# Patient Record
Sex: MALE | Race: WHITE | NOT HISPANIC OR LATINO | URBAN - METROPOLITAN AREA
[De-identification: names, ages, dates, MRNs, and addresses within clinical notes are randomized per-mention and may not be internally consistent; named-entity substitution may affect disease eponyms.]

---
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
## 2018-04-21 NOTE — ED PEDIATRIC NURSE REASSESSMENT NOTE - NS ED NURSE REASSESS COMMENT FT2
Internal Medicine Pt signed voluntary psychiatric admission paperwork after being evaluated by telepsych. Pt in no apparent distress, one to one remains in place. EKG completed- copy on pt chart. Pt has no complaints at this time. Offered pt warm blanket but pt refused. Environment remains safe with lights turned off to promote relaxing environment for pt. Will continue to monitor.

## 2018-05-02 ENCOUNTER — EMERGENCY (EMERGENCY)
Facility: HOSPITAL | Age: 18
LOS: 0 days | Discharge: HOME | End: 2018-05-02
Attending: EMERGENCY MEDICINE | Admitting: EMERGENCY MEDICINE
Payer: MEDICAID

## 2018-05-02 VITALS
HEART RATE: 77 BPM | SYSTOLIC BLOOD PRESSURE: 117 MMHG | DIASTOLIC BLOOD PRESSURE: 57 MMHG | TEMPERATURE: 99 F | RESPIRATION RATE: 17 BRPM | OXYGEN SATURATION: 100 %

## 2018-05-02 VITALS — WEIGHT: 160.06 LBS | HEIGHT: 70 IN

## 2018-05-02 PROBLEM — F32.9 MAJOR DEPRESSIVE DISORDER, SINGLE EPISODE, UNSPECIFIED: Chronic | Status: ACTIVE | Noted: 2017-06-17

## 2018-05-02 LAB
ABO RH CONFIRMATION: SIGNIFICANT CHANGE UP
ALBUMIN SERPL ELPH-MCNC: 4.8 G/DL — SIGNIFICANT CHANGE UP (ref 3.3–5)
ALP SERPL-CCNC: 84 U/L — SIGNIFICANT CHANGE UP (ref 40–120)
ALT FLD-CCNC: 33 U/L — SIGNIFICANT CHANGE UP (ref 12–78)
ANION GAP SERPL CALC-SCNC: 8 MMOL/L — SIGNIFICANT CHANGE UP (ref 5–17)
APTT BLD: 31.8 SEC — SIGNIFICANT CHANGE UP (ref 27.5–37.4)
AST SERPL-CCNC: 16 U/L — SIGNIFICANT CHANGE UP (ref 15–37)
BASOPHILS # BLD AUTO: 0.03 K/UL — SIGNIFICANT CHANGE UP (ref 0–0.2)
BASOPHILS NFR BLD AUTO: 0.2 % — SIGNIFICANT CHANGE UP (ref 0–2)
BILIRUB SERPL-MCNC: 1.2 MG/DL — SIGNIFICANT CHANGE UP (ref 0.2–1.2)
BLD GP AB SCN SERPL QL: SIGNIFICANT CHANGE UP
BUN SERPL-MCNC: 17 MG/DL — SIGNIFICANT CHANGE UP (ref 7–23)
CALCIUM SERPL-MCNC: 10.1 MG/DL — SIGNIFICANT CHANGE UP (ref 8.5–10.1)
CHLORIDE SERPL-SCNC: 106 MMOL/L — SIGNIFICANT CHANGE UP (ref 96–108)
CO2 SERPL-SCNC: 26 MMOL/L — SIGNIFICANT CHANGE UP (ref 22–31)
CREAT SERPL-MCNC: 0.98 MG/DL — SIGNIFICANT CHANGE UP (ref 0.5–1.3)
EOSINOPHIL # BLD AUTO: 0.04 K/UL — SIGNIFICANT CHANGE UP (ref 0–0.5)
EOSINOPHIL NFR BLD AUTO: 0.2 % — SIGNIFICANT CHANGE UP (ref 0–6)
GLUCOSE SERPL-MCNC: 121 MG/DL — HIGH (ref 70–99)
HCT VFR BLD CALC: 47.7 % — SIGNIFICANT CHANGE UP (ref 39–50)
HGB BLD-MCNC: 17.1 G/DL — HIGH (ref 13–17)
IMM GRANULOCYTES NFR BLD AUTO: 0.4 % — SIGNIFICANT CHANGE UP (ref 0–1.5)
INR BLD: 1.19 RATIO — HIGH (ref 0.88–1.16)
LACTATE SERPL-SCNC: 1.8 MMOL/L — SIGNIFICANT CHANGE UP (ref 0.7–2)
LIDOCAIN IGE QN: 97 U/L — SIGNIFICANT CHANGE UP (ref 73–393)
LYMPHOCYTES # BLD AUTO: 0.88 K/UL — LOW (ref 1–3.3)
LYMPHOCYTES # BLD AUTO: 4.7 % — LOW (ref 13–44)
MCHC RBC-ENTMCNC: 32.1 PG — SIGNIFICANT CHANGE UP (ref 27–34)
MCHC RBC-ENTMCNC: 35.8 GM/DL — SIGNIFICANT CHANGE UP (ref 32–36)
MCV RBC AUTO: 89.7 FL — SIGNIFICANT CHANGE UP (ref 80–100)
MONOCYTES # BLD AUTO: 0.72 K/UL — SIGNIFICANT CHANGE UP (ref 0–0.9)
MONOCYTES NFR BLD AUTO: 3.8 % — SIGNIFICANT CHANGE UP (ref 2–14)
NEUTROPHILS # BLD AUTO: 17.03 K/UL — HIGH (ref 1.8–7.4)
NEUTROPHILS NFR BLD AUTO: 90.7 % — HIGH (ref 43–77)
NRBC # BLD: 0 /100 WBCS — SIGNIFICANT CHANGE UP (ref 0–0)
PLATELET # BLD AUTO: 265 K/UL — SIGNIFICANT CHANGE UP (ref 150–400)
POTASSIUM SERPL-MCNC: 4 MMOL/L — SIGNIFICANT CHANGE UP (ref 3.5–5.3)
POTASSIUM SERPL-SCNC: 4 MMOL/L — SIGNIFICANT CHANGE UP (ref 3.5–5.3)
PROT SERPL-MCNC: 8.4 GM/DL — HIGH (ref 6–8.3)
PROTHROM AB SERPL-ACNC: 12.9 SEC — HIGH (ref 9.8–12.7)
RBC # BLD: 5.32 M/UL — SIGNIFICANT CHANGE UP (ref 4.2–5.8)
RBC # FLD: 12.6 % — SIGNIFICANT CHANGE UP (ref 10.3–14.5)
SODIUM SERPL-SCNC: 140 MMOL/L — SIGNIFICANT CHANGE UP (ref 135–145)
TYPE + AB SCN PNL BLD: SIGNIFICANT CHANGE UP
WBC # BLD: 18.78 K/UL — HIGH (ref 3.8–10.5)
WBC # FLD AUTO: 18.78 K/UL — HIGH (ref 3.8–10.5)

## 2018-05-02 PROCEDURE — 74177 CT ABD & PELVIS W/CONTRAST: CPT | Mod: 26

## 2018-05-02 PROCEDURE — 99284 EMERGENCY DEPT VISIT MOD MDM: CPT

## 2018-05-02 RX ORDER — SODIUM CHLORIDE 9 MG/ML
3 INJECTION INTRAMUSCULAR; INTRAVENOUS; SUBCUTANEOUS ONCE
Qty: 0 | Refills: 0 | Status: COMPLETED | OUTPATIENT
Start: 2018-05-02 | End: 2018-05-02

## 2018-05-02 RX ORDER — LIDOCAINE 4 G/100G
10 CREAM TOPICAL ONCE
Qty: 0 | Refills: 0 | Status: COMPLETED | OUTPATIENT
Start: 2018-05-02 | End: 2018-05-02

## 2018-05-02 RX ORDER — FAMOTIDINE 10 MG/ML
20 INJECTION INTRAVENOUS ONCE
Qty: 0 | Refills: 0 | Status: COMPLETED | OUTPATIENT
Start: 2018-05-02 | End: 2018-05-02

## 2018-05-02 RX ORDER — SODIUM CHLORIDE 9 MG/ML
1000 INJECTION INTRAMUSCULAR; INTRAVENOUS; SUBCUTANEOUS ONCE
Qty: 0 | Refills: 0 | Status: COMPLETED | OUTPATIENT
Start: 2018-05-02 | End: 2018-05-02

## 2018-05-02 RX ORDER — ONDANSETRON 8 MG/1
1 TABLET, FILM COATED ORAL
Qty: 9 | Refills: 0 | OUTPATIENT
Start: 2018-05-02 | End: 2018-05-04

## 2018-05-02 RX ORDER — ONDANSETRON 8 MG/1
4 TABLET, FILM COATED ORAL ONCE
Qty: 0 | Refills: 0 | Status: COMPLETED | OUTPATIENT
Start: 2018-05-02 | End: 2018-05-02

## 2018-05-02 RX ORDER — HYDROMORPHONE HYDROCHLORIDE 2 MG/ML
1 INJECTION INTRAMUSCULAR; INTRAVENOUS; SUBCUTANEOUS ONCE
Qty: 0 | Refills: 0 | Status: DISCONTINUED | OUTPATIENT
Start: 2018-05-02 | End: 2018-05-02

## 2018-05-02 RX ORDER — HALOPERIDOL DECANOATE 100 MG/ML
3 INJECTION INTRAMUSCULAR ONCE
Qty: 0 | Refills: 0 | Status: COMPLETED | OUTPATIENT
Start: 2018-05-02 | End: 2018-05-02

## 2018-05-02 RX ADMIN — ONDANSETRON 4 MILLIGRAM(S): 8 TABLET, FILM COATED ORAL at 16:56

## 2018-05-02 RX ADMIN — HYDROMORPHONE HYDROCHLORIDE 1 MILLIGRAM(S): 2 INJECTION INTRAMUSCULAR; INTRAVENOUS; SUBCUTANEOUS at 17:56

## 2018-05-02 RX ADMIN — LIDOCAINE 10 MILLILITER(S): 4 CREAM TOPICAL at 19:55

## 2018-05-02 RX ADMIN — SODIUM CHLORIDE 1000 MILLILITER(S): 9 INJECTION INTRAMUSCULAR; INTRAVENOUS; SUBCUTANEOUS at 16:57

## 2018-05-02 RX ADMIN — SODIUM CHLORIDE 3 MILLILITER(S): 9 INJECTION INTRAMUSCULAR; INTRAVENOUS; SUBCUTANEOUS at 16:57

## 2018-05-02 RX ADMIN — HALOPERIDOL DECANOATE 3 MILLIGRAM(S): 100 INJECTION INTRAMUSCULAR at 19:50

## 2018-05-02 RX ADMIN — SODIUM CHLORIDE 1000 MILLILITER(S): 9 INJECTION INTRAMUSCULAR; INTRAVENOUS; SUBCUTANEOUS at 18:45

## 2018-05-02 RX ADMIN — FAMOTIDINE 20 MILLIGRAM(S): 10 INJECTION INTRAVENOUS at 19:55

## 2018-05-02 RX ADMIN — Medication 30 MILLILITER(S): at 19:55

## 2018-05-02 RX ADMIN — HYDROMORPHONE HYDROCHLORIDE 1 MILLIGRAM(S): 2 INJECTION INTRAMUSCULAR; INTRAVENOUS; SUBCUTANEOUS at 17:21

## 2018-05-02 RX ADMIN — ONDANSETRON 4 MILLIGRAM(S): 8 TABLET, FILM COATED ORAL at 18:45

## 2018-05-02 NOTE — ED ADULT TRIAGE NOTE - CHIEF COMPLAINT QUOTE
Patient comes to ED for RLQ pain with vomiting and diarrhea since this morning. pt came from PCP for r/o appendix

## 2018-05-02 NOTE — ED STATDOCS - ATTENDING CONTRIBUTION TO CARE
I, Woodrow Mcpherosn MD,  performed the initial face to face bedside interview with this patient regarding history of present illness, review of symptoms and relevant past medical, social and family history.  I completed an independent physical examination.  I was the initial provider who evaluated this patient. I have signed out the follow up of any pending tests (i.e. labs, radiological studies) to the ACP.  I have communicated the patient’s plan of care and disposition with the ACP.  The history, relevant review of systems, past medical and surgical history, medical decision making, and physical examination was documented by the scribe in my presence and I attest to the accuracy of the documentation.  I, Woodrow Mcpherson MD, personally saw the patient with ACP.  I have personally performed a face to face diagnostic evaluation on this patient.  I have reviewed the ACP note and agree with the history, exam, and plan of care, except as noted.

## 2018-05-02 NOTE — ED STATDOCS - OBJECTIVE STATEMENT
16 y/o male with a PMHx of GERD presents to the ED c/o abd pain, N/V/D since this morning. Pt reports b/l lower abdominal pain that woke pt up with N/V/D beginning at 10:00am this morning. Abd pain is severe, worse in RLQ than LLQ. Pt notes pain with breathing deeply. No fever or any other acute complaints at this time. Pt saw PMD today who advised pt to come to ED for CT scan. No h/o abdominal surgeries. NKDA. +Occasional cocaine use.

## 2018-05-02 NOTE — ED STATDOCS - PROGRESS NOTE DETAILS
17 yr. old male PMH: GERD presents to ED with abdominal pain ,+nausea, +vomiting sudden onset this am. at 10.   Reports bilateral lower abdominal pain. Increased pain on deep inspiration. No fever at present. Seen by Dr. Cruz and sent to ED for further evaluation and CT possible appendicitis. Seen and examined by attending in intake. Plan: IV, IVF, Labs, CT pain medication and zofran. Will F/U with results and re evaluate. Omkarnghenok NP

## 2018-05-04 DIAGNOSIS — K52.9 NONINFECTIVE GASTROENTERITIS AND COLITIS, UNSPECIFIED: ICD-10-CM

## 2018-05-04 DIAGNOSIS — R11.2 NAUSEA WITH VOMITING, UNSPECIFIED: ICD-10-CM

## 2018-05-04 DIAGNOSIS — R10.31 RIGHT LOWER QUADRANT PAIN: ICD-10-CM

## 2018-08-07 ENCOUNTER — EMERGENCY (EMERGENCY)
Facility: HOSPITAL | Age: 18
LOS: 0 days | Discharge: HOME | End: 2018-08-07
Attending: EMERGENCY MEDICINE | Admitting: EMERGENCY MEDICINE
Payer: SELF-PAY

## 2018-08-07 VITALS
SYSTOLIC BLOOD PRESSURE: 123 MMHG | DIASTOLIC BLOOD PRESSURE: 77 MMHG | OXYGEN SATURATION: 100 % | TEMPERATURE: 98 F | RESPIRATION RATE: 13 BRPM | HEART RATE: 75 BPM

## 2018-08-07 VITALS — WEIGHT: 166.01 LBS | HEIGHT: 70 IN
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
## 2018-08-07 DIAGNOSIS — S30.810A ABRASION OF LOWER BACK AND PELVIS, INITIAL ENCOUNTER: ICD-10-CM

## 2018-08-07 DIAGNOSIS — S20.419A ABRASION OF UNSPECIFIED BACK WALL OF THORAX, INITIAL ENCOUNTER: ICD-10-CM
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
## 2018-08-07 PROBLEM — K21.9 GASTRO-ESOPHAGEAL REFLUX DISEASE WITHOUT ESOPHAGITIS: Chronic | Status: ACTIVE | Noted: 2017-06-17

## 2018-08-07 PROBLEM — F19.20 OTHER PSYCHOACTIVE SUBSTANCE DEPENDENCE, UNCOMPLICATED: Chronic | Status: ACTIVE | Noted: 2017-06-17

## 2018-08-07 PROBLEM — R45.851 SUICIDAL IDEATIONS: Chronic | Status: ACTIVE | Noted: 2017-06-17

## 2018-08-07 LAB
ALBUMIN SERPL ELPH-MCNC: 4.2 G/DL — SIGNIFICANT CHANGE UP (ref 3.3–5)
ALP SERPL-CCNC: 80 U/L — SIGNIFICANT CHANGE UP (ref 40–120)
ALT FLD-CCNC: 29 U/L — SIGNIFICANT CHANGE UP (ref 12–78)
ANION GAP SERPL CALC-SCNC: 9 MMOL/L — SIGNIFICANT CHANGE UP (ref 5–17)
APTT BLD: 34.1 SEC — SIGNIFICANT CHANGE UP (ref 27.5–37.4)
AST SERPL-CCNC: 18 U/L — SIGNIFICANT CHANGE UP (ref 15–37)
BASOPHILS # BLD AUTO: 0.03 K/UL — SIGNIFICANT CHANGE UP (ref 0–0.2)
BASOPHILS NFR BLD AUTO: 0.4 % — SIGNIFICANT CHANGE UP (ref 0–2)
BILIRUB SERPL-MCNC: 0.4 MG/DL — SIGNIFICANT CHANGE UP (ref 0.2–1.2)
BLD GP AB SCN SERPL QL: SIGNIFICANT CHANGE UP
BUN SERPL-MCNC: 11 MG/DL — SIGNIFICANT CHANGE UP (ref 7–23)
CALCIUM SERPL-MCNC: 8.5 MG/DL — SIGNIFICANT CHANGE UP (ref 8.5–10.1)
CHLORIDE SERPL-SCNC: 110 MMOL/L — HIGH (ref 96–108)
CO2 SERPL-SCNC: 24 MMOL/L — SIGNIFICANT CHANGE UP (ref 22–31)
CREAT SERPL-MCNC: 0.77 MG/DL — SIGNIFICANT CHANGE UP (ref 0.5–1.3)
EOSINOPHIL # BLD AUTO: 0.2 K/UL — SIGNIFICANT CHANGE UP (ref 0–0.5)
EOSINOPHIL NFR BLD AUTO: 2.5 % — SIGNIFICANT CHANGE UP (ref 0–6)
GLUCOSE SERPL-MCNC: 116 MG/DL — HIGH (ref 70–99)
HCT VFR BLD CALC: 41 % — SIGNIFICANT CHANGE UP (ref 39–50)
HGB BLD-MCNC: 14.4 G/DL — SIGNIFICANT CHANGE UP (ref 13–17)
IMM GRANULOCYTES NFR BLD AUTO: 0.5 % — SIGNIFICANT CHANGE UP (ref 0–1.5)
INR BLD: 1.15 RATIO — SIGNIFICANT CHANGE UP (ref 0.88–1.16)
LYMPHOCYTES # BLD AUTO: 2.34 K/UL — SIGNIFICANT CHANGE UP (ref 1–3.3)
LYMPHOCYTES # BLD AUTO: 29.2 % — SIGNIFICANT CHANGE UP (ref 13–44)
MCHC RBC-ENTMCNC: 32.1 PG — SIGNIFICANT CHANGE UP (ref 27–34)
MCHC RBC-ENTMCNC: 35.1 GM/DL — SIGNIFICANT CHANGE UP (ref 32–36)
MCV RBC AUTO: 91.3 FL — SIGNIFICANT CHANGE UP (ref 80–100)
MONOCYTES # BLD AUTO: 0.67 K/UL — SIGNIFICANT CHANGE UP (ref 0–0.9)
MONOCYTES NFR BLD AUTO: 8.4 % — SIGNIFICANT CHANGE UP (ref 2–14)
NEUTROPHILS # BLD AUTO: 4.74 K/UL — SIGNIFICANT CHANGE UP (ref 1.8–7.4)
NEUTROPHILS NFR BLD AUTO: 59 % — SIGNIFICANT CHANGE UP (ref 43–77)
NRBC # BLD: 0 /100 WBCS — SIGNIFICANT CHANGE UP (ref 0–0)
PLATELET # BLD AUTO: 258 K/UL — SIGNIFICANT CHANGE UP (ref 150–400)
POTASSIUM SERPL-MCNC: 4 MMOL/L — SIGNIFICANT CHANGE UP (ref 3.5–5.3)
POTASSIUM SERPL-SCNC: 4 MMOL/L — SIGNIFICANT CHANGE UP (ref 3.5–5.3)
PROT SERPL-MCNC: 7.5 GM/DL — SIGNIFICANT CHANGE UP (ref 6–8.3)
PROTHROM AB SERPL-ACNC: 12.5 SEC — SIGNIFICANT CHANGE UP (ref 9.8–12.7)
RBC # BLD: 4.49 M/UL — SIGNIFICANT CHANGE UP (ref 4.2–5.8)
RBC # FLD: 12.5 % — SIGNIFICANT CHANGE UP (ref 10.3–14.5)
SODIUM SERPL-SCNC: 143 MMOL/L — SIGNIFICANT CHANGE UP (ref 135–145)
TROPONIN I SERPL-MCNC: <0.015 NG/ML — SIGNIFICANT CHANGE UP (ref 0.01–0.04)
TYPE + AB SCN PNL BLD: SIGNIFICANT CHANGE UP
WBC # BLD: 8.02 K/UL — SIGNIFICANT CHANGE UP (ref 3.8–10.5)
WBC # FLD AUTO: 8.02 K/UL — SIGNIFICANT CHANGE UP (ref 3.8–10.5)

## 2018-08-07 PROCEDURE — 74177 CT ABD & PELVIS W/CONTRAST: CPT | Mod: 26

## 2018-08-07 PROCEDURE — 93010 ELECTROCARDIOGRAM REPORT: CPT

## 2018-08-07 PROCEDURE — 71260 CT THORAX DX C+: CPT | Mod: 26

## 2018-08-07 PROCEDURE — 72125 CT NECK SPINE W/O DYE: CPT | Mod: 26

## 2018-08-07 PROCEDURE — 99285 EMERGENCY DEPT VISIT HI MDM: CPT

## 2018-08-07 PROCEDURE — 70450 CT HEAD/BRAIN W/O DYE: CPT | Mod: 26

## 2018-08-07 RX ORDER — SODIUM CHLORIDE 9 MG/ML
1000 INJECTION INTRAMUSCULAR; INTRAVENOUS; SUBCUTANEOUS ONCE
Qty: 0 | Refills: 0 | Status: COMPLETED | OUTPATIENT
Start: 2018-08-07 | End: 2018-08-07

## 2018-08-07 RX ORDER — FENTANYL CITRATE 50 UG/ML
25 INJECTION INTRAVENOUS ONCE
Qty: 0 | Refills: 0 | Status: DISCONTINUED | OUTPATIENT
Start: 2018-08-07 | End: 2018-08-07

## 2018-08-07 RX ORDER — IBUPROFEN 200 MG
1 TABLET ORAL
Qty: 30 | Refills: 0 | OUTPATIENT
Start: 2018-08-07

## 2018-08-07 RX ORDER — CYCLOBENZAPRINE HYDROCHLORIDE 10 MG/1
1 TABLET, FILM COATED ORAL
Qty: 15 | Refills: 0 | OUTPATIENT
Start: 2018-08-07

## 2018-08-07 RX ORDER — HYDROMORPHONE HYDROCHLORIDE 2 MG/ML
1 INJECTION INTRAMUSCULAR; INTRAVENOUS; SUBCUTANEOUS ONCE
Qty: 0 | Refills: 0 | Status: DISCONTINUED | OUTPATIENT
Start: 2018-08-07 | End: 2018-08-07

## 2018-08-07 RX ORDER — ONDANSETRON 8 MG/1
4 TABLET, FILM COATED ORAL ONCE
Qty: 0 | Refills: 0 | Status: COMPLETED | OUTPATIENT
Start: 2018-08-07 | End: 2018-08-07

## 2018-08-07 RX ORDER — MORPHINE SULFATE 50 MG/1
4 CAPSULE, EXTENDED RELEASE ORAL ONCE
Qty: 0 | Refills: 0 | Status: DISCONTINUED | OUTPATIENT
Start: 2018-08-07 | End: 2018-08-07

## 2018-08-07 RX ADMIN — SODIUM CHLORIDE 1000 MILLILITER(S): 9 INJECTION INTRAMUSCULAR; INTRAVENOUS; SUBCUTANEOUS at 14:30

## 2018-08-07 RX ADMIN — FENTANYL CITRATE 25 MICROGRAM(S): 50 INJECTION INTRAVENOUS at 13:36

## 2018-08-07 RX ADMIN — ONDANSETRON 4 MILLIGRAM(S): 8 TABLET, FILM COATED ORAL at 14:18

## 2018-08-07 RX ADMIN — MORPHINE SULFATE 4 MILLIGRAM(S): 50 CAPSULE, EXTENDED RELEASE ORAL at 14:51

## 2018-08-07 RX ADMIN — HYDROMORPHONE HYDROCHLORIDE 1 MILLIGRAM(S): 2 INJECTION INTRAMUSCULAR; INTRAVENOUS; SUBCUTANEOUS at 13:54

## 2018-08-07 RX ADMIN — MORPHINE SULFATE 4 MILLIGRAM(S): 50 CAPSULE, EXTENDED RELEASE ORAL at 17:00

## 2018-08-07 RX ADMIN — HYDROMORPHONE HYDROCHLORIDE 1 MILLIGRAM(S): 2 INJECTION INTRAMUSCULAR; INTRAVENOUS; SUBCUTANEOUS at 14:50

## 2018-08-07 RX ADMIN — MORPHINE SULFATE 4 MILLIGRAM(S): 50 CAPSULE, EXTENDED RELEASE ORAL at 16:20

## 2018-08-07 RX ADMIN — SODIUM CHLORIDE 2000 MILLILITER(S): 9 INJECTION INTRAMUSCULAR; INTRAVENOUS; SUBCUTANEOUS at 13:28

## 2018-08-07 RX ADMIN — MORPHINE SULFATE 4 MILLIGRAM(S): 50 CAPSULE, EXTENDED RELEASE ORAL at 15:30

## 2018-08-07 RX ADMIN — FENTANYL CITRATE 25 MICROGRAM(S): 50 INJECTION INTRAVENOUS at 14:50

## 2018-08-07 NOTE — ED ADULT NURSE REASSESSMENT NOTE - NS ED NURSE REASSESS COMMENT FT1
see trauma flowsheet. all tests negative. pts pain is better. being discharged home with girlfriend. gave pt a sling for right shoulder. script being sent to his pharmacy

## 2018-08-07 NOTE — ED ADULT NURSE NOTE - NSIMPLEMENTINTERV_GEN_ALL_ED
Implemented All Fall Risk Interventions:  [place] to call system. Call bell, personal items and telephone within reach. Instruct patient to call for assistance. Room bathroom lighting operational. Non-slip footwear when patient is off stretcher. Physically safe environment: no spills, clutter or unnecessary equipment. Stretcher in lowest position, wheels locked, appropriate side rails in place. Provide visual cue, wrist band, yellow gown, etc. Monitor gait and stability. Monitor for mental status changes and reorient to person, place, and time. Review medications for side effects contributing to fall risk. Reinforce activity limits and safety measures with patient and family.

## 2018-08-07 NOTE — ED PROVIDER NOTE - PROGRESS NOTE DETAILS
AJM: pt receive din signout. feeling improved. workup neg. seen by dr hendricks and cleared by trauma. All results discussed with the patient, and a copy of results has been provided. Patient is comfortable with dc plan for home. Opportunity for questions was provided and all questions have been adressed.
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
## 2018-08-07 NOTE — ED PROVIDER NOTE - NS ED ATTENDING STATEMENT MOD
Patient may shower after 24 hours. - Stressed appropriate blood glucose control  -  Continue antibiotics per infectious disease  -  Follow-up in 2 weeks For reevaluation  -The patient has any questions, medications he is to call the office.       Kori Dupree DPM  Cell:[phone]
Attending Only
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
## 2018-08-07 NOTE — ED PROVIDER NOTE - MUSCULOSKELETAL, MLM
+posterior c-spine ttp.  Left lateral chest wall ecchymosis abrasion TTP. No midline C/T/LS ttp.  No facial ttp.  No hip instability or ttp/FROM/nv intact, moves all 4 extremities with no pain.

## 2019-07-09 ENCOUNTER — TRANSCRIPTION ENCOUNTER (OUTPATIENT)
Age: 19
End: 2019-07-09

## 2019-12-04 ENCOUNTER — TRANSCRIPTION ENCOUNTER (OUTPATIENT)
Age: 19
End: 2019-12-04

## 2020-06-18 NOTE — BEHAVIORAL HEALTH ASSESSMENT NOTE - DESCRIPTION (FIRST USE, LAST USE, QUANTITY, FREQUENCY, DURATION)
HR=78 bpm, GEFG=991/86 mmhg, SpO2=99.0 %, Resp=16 B/min, EtCO2=31 mmHg, Apnea=1 Seconds, Bushra=10 reports drinking 2-3 "tall boys" per day with intermittent "shakes" when not drinking 2x/week once every other week in the past, took tramadol yesterday xanax in the past LSD once every other week

## 2020-07-30 ENCOUNTER — EMERGENCY (EMERGENCY)
Facility: HOSPITAL | Age: 20
LOS: 0 days | Discharge: HOME | End: 2020-07-30
Payer: MEDICARE

## 2020-07-30 VITALS
TEMPERATURE: 98 F | SYSTOLIC BLOOD PRESSURE: 113 MMHG | DIASTOLIC BLOOD PRESSURE: 74 MMHG | HEART RATE: 66 BPM | OXYGEN SATURATION: 99 % | RESPIRATION RATE: 18 BRPM

## 2020-07-30 DIAGNOSIS — Z11.59 ENCOUNTER FOR SCREENING FOR OTHER VIRAL DISEASES: ICD-10-CM

## 2020-07-30 DIAGNOSIS — Z03.818 ENCOUNTER FOR OBSERVATION FOR SUSPECTED EXPOSURE TO OTHER BIOLOGICAL AGENTS RULED OUT: ICD-10-CM

## 2020-07-30 PROCEDURE — 99283 EMERGENCY DEPT VISIT LOW MDM: CPT

## 2020-07-30 PROCEDURE — U0003: CPT

## 2020-07-30 NOTE — ED ADULT TRIAGE NOTE - CHIEF COMPLAINT QUOTE
pt presents to ED for covid testing, pt deneis symptoms, pt seen and evaluated by PA pt gave verbal  consent to recevie results via text

## 2020-07-30 NOTE — ED STATDOCS - OBJECTIVE STATEMENT
Patient presents with no symptoms at this time. No recent exposure to COVID-19. Patient here for testing.

## 2020-07-30 NOTE — ED STATDOCS - CLINICAL SUMMARY MEDICAL DECISION MAKING FREE TEXT BOX
Patient asymptomatic with recent exposure. Patient well appearing, will test for COVID-19 and discharge.

## 2020-07-30 NOTE — ED STATDOCS - PATIENT PORTAL LINK FT
You can access the FollowMyHealth Patient Portal offered by [facility] by registering at the following website: [email or web address]. By joining [facility]’s FollowMyHealth portal, you will also be able to view your health information using other applications (apps) compatible with our system.

## 2020-07-30 NOTE — ED STATDOCS - NS ED ROS FT
GEN: Denies fever, chills, sick contacts, recent travel  HEENT: Denies dizziness, blurry vision, HA  Cardiac: Denies CP, SOB, palpitations  Lungs: Denies cough, wheezing  PV: Denies LE swelling  GI: Denies nausea, vomiting, diarrhea, constipation, flank pain  : Denies hematuria, dysuria, frequency, urgency  Skin: Denies rash, redness, open wound  MSK: Denies pain, decreased ROM  Neuro: Denies dizziness, difficulty speaking, difficulty swallowing, numbness/tingling in extremities, loss of bowel/bladder control, weakness in extremities

## 2020-07-31 LAB — SARS-COV-2 RNA SPEC QL NAA+PROBE: SIGNIFICANT CHANGE UP

## 2020-08-04 NOTE — BEHAVIORAL HEALTH ASSESSMENT NOTE - NS ED BHA MED ROS ALLERGIC IMMUNOLOGIC
[Mother] : mother [Yes] : Patient goes to dentist yearly [Toothpaste] : Primary Fluoride Source: Toothpaste [Up to date] : Up to date [Sleep Concerns] : no sleep concerns [Eats meals with family] : eats meals with family [Grade: ____] : Grade: [unfilled] [Normal Performance] : normal performance [Eats regular meals including adequate fruits and vegetables] : eats regular meals including adequate fruits and vegetables [At least 1 hour of physical activity a day] : at least 1 hour of physical activity a day [Has concerns about body or appearance] : does not have concerns about body or appearance [Uses electronic nicotine delivery system] : does not use electronic nicotine delivery system [Has interests/participates in community activities/volunteers] : has interests/participates in community activities/volunteers. [Uses drugs] : does not use drugs  [Uses tobacco] : does not use tobacco [Drinks alcohol] : does not drink alcohol [No] : Patient has not had sexual intercourse [FreeTextEntry1] : \par -h/o Chiari malformation. s/p surgery x 2\par -h/o card eval in past- last '18. + h/o SVT\par   Asymptomatic. No restrictions\par -s/p endo consult re; BS concern. Has f/u appt in Sept [de-identified] : in a club. Likes to draw No complaints

## 2021-02-17 NOTE — BEHAVIORAL HEALTH ASSESSMENT NOTE - NSBHCHARTREVIEWLAB_PSY_A_CORE FT
Additional Notes: Patient consent was obtained to proceed with the visit and recommended plan of care after discussion of all risks and benefits, including the risks of COVID-19 exposure. Detail Level: Simple Additional Notes: Patient had begun using Tremfya in past however states she was not able to continue treatment due to problems regarding insurance and previous dermatologist. 06-16    140  |  105  |  12  ----------------------------<  117<H>  3.6   |  29  |  0.77    Ca    8.9      16 Jun 2017 21:35    TPro  7.3  /  Alb  4.1  /  TBili  0.4  /  DBili  x   /  AST  14<L>  /  ALT  26  /  AlkPhos  86  06-16                        15.0   11.3  )-----------( 282      ( 16 Jun 2017 21:35 )             42.2

## 2021-07-03 ENCOUNTER — EMERGENCY (EMERGENCY)
Facility: HOSPITAL | Age: 21
LOS: 0 days | Discharge: HOME | End: 2021-07-03
Attending: EMERGENCY MEDICINE
Payer: SELF-PAY

## 2021-07-03 VITALS — HEIGHT: 70 IN | HEART RATE: 74 BPM | WEIGHT: 149.91 LBS | OXYGEN SATURATION: 97 %

## 2021-07-03 VITALS
DIASTOLIC BLOOD PRESSURE: 81 MMHG | HEART RATE: 80 BPM | OXYGEN SATURATION: 98 % | RESPIRATION RATE: 18 BRPM | SYSTOLIC BLOOD PRESSURE: 120 MMHG | TEMPERATURE: 98 F

## 2021-07-03 DIAGNOSIS — J02.9 ACUTE PHARYNGITIS, UNSPECIFIED: ICD-10-CM

## 2021-07-03 DIAGNOSIS — Z91.5 PERSONAL HISTORY OF SELF-HARM: ICD-10-CM

## 2021-07-03 DIAGNOSIS — R07.0 PAIN IN THROAT: ICD-10-CM

## 2021-07-03 DIAGNOSIS — K21.9 GASTRO-ESOPHAGEAL REFLUX DISEASE WITHOUT ESOPHAGITIS: ICD-10-CM

## 2021-07-03 DIAGNOSIS — F19.20 OTHER PSYCHOACTIVE SUBSTANCE DEPENDENCE, UNCOMPLICATED: ICD-10-CM

## 2021-07-03 DIAGNOSIS — F32.9 MAJOR DEPRESSIVE DISORDER, SINGLE EPISODE, UNSPECIFIED: ICD-10-CM

## 2021-07-03 DIAGNOSIS — F17.200 NICOTINE DEPENDENCE, UNSPECIFIED, UNCOMPLICATED: ICD-10-CM

## 2021-07-03 PROCEDURE — 96374 THER/PROPH/DIAG INJ IV PUSH: CPT

## 2021-07-03 PROCEDURE — 99284 EMERGENCY DEPT VISIT MOD MDM: CPT

## 2021-07-03 PROCEDURE — 96375 TX/PRO/DX INJ NEW DRUG ADDON: CPT

## 2021-07-03 PROCEDURE — 99284 EMERGENCY DEPT VISIT MOD MDM: CPT | Mod: 25

## 2021-07-03 RX ORDER — DIPHENHYDRAMINE HCL 50 MG
50 CAPSULE ORAL ONCE
Refills: 0 | Status: COMPLETED | OUTPATIENT
Start: 2021-07-03 | End: 2021-07-03

## 2021-07-03 RX ORDER — DEXAMETHASONE 0.5 MG/5ML
10 ELIXIR ORAL ONCE
Refills: 0 | Status: COMPLETED | OUTPATIENT
Start: 2021-07-03 | End: 2021-07-03

## 2021-07-03 RX ORDER — KETOROLAC TROMETHAMINE 30 MG/ML
30 SYRINGE (ML) INJECTION ONCE
Refills: 0 | Status: DISCONTINUED | OUTPATIENT
Start: 2021-07-03 | End: 2021-07-03

## 2021-07-03 RX ORDER — LIDOCAINE 4 G/100G
10 CREAM TOPICAL ONCE
Refills: 0 | Status: COMPLETED | OUTPATIENT
Start: 2021-07-03 | End: 2021-07-03

## 2021-07-03 RX ADMIN — LIDOCAINE 10 MILLILITER(S): 4 CREAM TOPICAL at 15:23

## 2021-07-03 RX ADMIN — Medication 10 MILLIGRAM(S): at 13:56

## 2021-07-03 RX ADMIN — Medication 50 MILLIGRAM(S): at 13:56

## 2021-07-03 RX ADMIN — Medication 30 MILLIGRAM(S): at 14:15

## 2021-07-03 RX ADMIN — Medication 30 MILLIGRAM(S): at 13:55
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
## 2021-07-03 NOTE — ED PROVIDER NOTE - PROGRESS NOTE DETAILS
patient feeling better. drank full cup of water without issue. pt to follow up with PMD and GI. vss. no signs of anaphylaxis/allergic rxn. Hans Reyes M.D., Attending Physician

## 2021-07-03 NOTE — ED PROVIDER NOTE - PENDING LAB RAD OPT OUT
[facility]  HEMATOLOGY & ONCOLOGY    Cancer Staging Information:  Cancer Staging  Malignant neoplasm of upper-outer quadrant of left female breast (CMS/HCC)  Staging form: Breast, AJCC V7  - Clinical stage from 10/11/2016: Stage IV (T2, N1, M1) - Signed by Calli Monsalve APRN on 10/11/2016        Subjective     VISIT DIAGNOSIS:   Encounter Diagnosis   Name Primary?   • Carcinoma of left breast metastatic to bone (CMS/HCC)        REASON FOR VISIT:     Chief Complaint   Patient presents with   • Breast Cancer     her efor chemo, complaintes of having trouble swallowing and things getting hung in throat         HEMATOLOGY / ONCOLOGY HISTORY:   Oncology/Hematology History    Patient is a 67-year-old postmenopausal female who had onset of her menses at age 9 and menopause at age 48. She received oral contraceptives for approximately 14 years and did not receive hormonal manipulation. Patient has a maternal cousin had breast cancer. Patient has had no prior breast biopsies. Patient found a palpable left breast mass as well as a left axillary mass. Patient had a bloody nipple discharge which been present for approximately 6 months. On 6/20/2012, a mammogram was performed showing a subareolar mass consistent with malignancy. Bilateral breast ultrasound revealed an ill-defined subareolar mass measuring 2.8-2.5 cm. There is a left axillary mass measuring 1.7 cm with suspicion of extracapsular extension. There is no evidence of disease in the right breast. On 7/9/2012 patient underwent a left breast biopsy and placement of marker clip. Left breast biopsy revealed an infiltrating lobular carcinoma grade 2 with focal ductal carcinoma in situ, solid pattern. A fine-needle biopsy of the left lymph node was consistent with metastatic carcinoma. Breast tumor profile revealed ER: 100%; LA: 98%; HER-2/maynor 2+; FISH: Unamplified; Ki-67: 71%; P 53 1%; DNA aneuploidy. A MRI of the breast were performed body  multiple abnormal enhancing masses within the left breast. There appears to be anterior retraction of the pectoralis muscle with no direct extension. There is a moderate to large, layering left pleural effusion appreciated. The right breast reveals 3 moderately enlarged lymph nodes in the posterior lateral aspect the right breast. These have fatty hilum but followup is recommended. Patient is undergone systemic studies including, on 8/2/2012, a CT scan of the brain showing atrophy. a CT scan that shows showing a small to moderate left pleural effusion with 3 subcentimeter nodule densities in the left lung.   She completed 4 cycles of neoadjuvant treatment with dose dense Adriamycin and Cytoxan. She had a mammogram which showed a significant reduction in the size of her left breast lesion. There is no axillary adenopathy noted. She has had an ultrasound revealing the  lesion reducing from 2.4 cm to 1 cm. Patient underwent a left mastectomy on 03/19/2013 finding negative invasive cancer, negative nodes. A 5% DCIS was noted. The patient has declined hormonal therapy. She did not need radiation therapy.  November 2014, she began to have evidence of lytic bone lesions at T5T6, frontal disease, an 8 mm lucency in the central frontal area of  the skull but nothing intracranial. Bone scan revealed only vertex tracer activity but bilateral ribs and thoracic spine, and other lesions in  her pelvis. At that time, she was started on letrozole since November 2014. She is taking Xgeva. She had progression found in bone in Feb 2015 on scan. She was started on Faslodex, Ibrance and continued xgeva.         Malignant neoplasm of upper-outer quadrant of left female breast (CMS/HCC)    7/9/2012 Initial Diagnosis     Malignant neoplasm of upper-outer quadrant of left female breast         7/10/2012 Biopsy     Left Breast Biopsy & Axillary Node FNA: A) Infiltrating lobular carcinoma, Grade 2. B) Foci of ductal carcinoma in situ, solid  pattern. C) Greatest dimension of the invasive carcinoma is 15.8mm.         3/19/2013 Surgery     Left Mastectomy w/ Axillary Tail: Focal residual ducatal carcinoma in situ (DCIS) 12 lymph nodes negative for metastatic carcinoma.         10/20/2014 Biopsy     Peritoneum Biopsy: Final Diagnosis: Malignant Cell Neoplasm.           Breast cancer metastasized to bone, unspecified laterality (CMS/HCC)    10/15/2012 -  Other Event     Left mammogram:  Impression:  1. Decreasing spiculation and density in the retroareolar left breast, near a previous biopsy.   2. Definite left axillary lesion is not appreciated.          2/5/2013 -  Other Event     Diagnostic Mammo:  Comparison is made to 10/15/2012 and 6/20/2012  The spiculated mass at 12:00 behind the nipple is nearly resolved.   Impression:  1. The mass on the left side is less apparent after receiving chemotherapy. There has been a good response to chmotherapy on the left side.   2. No suspicious abnormality is seen in the right breast to account for the new palable abnormality at the 4-5:00 position.          3/19/2013 Surgery     Breast Biopsy:  Final Diagnosis:  A. Breast, left mastectomy with axillary tail  1. Focal residual ductal carcinoma in situ (DICS) (less than 5% of tumor bed).  2. Negative for residual invasive carcinoma.   3. 12 Lymph Nodes, negative for metastatic carcinoma (0/12) focally, some lymph nodes demonstrate fibrosis/treatment effect.  B. Skin and soft tissue, left advancement flap:  1. Benign skin and subcutis.   2. Negative for carcinoma.          6/20/2013 -  Other Event     Diagnostic Mammo [facility]:  1. A subareolar mass is consistent with malignancy. Additional involement extends inferiorly sonographically and superolaterally mammograhically as demonstrated by calcifications. The mass measures approx 2.8 cm sonographically, but the involved region is likely much larger including an intraductal componet. The left axillary lymph nodes are  also involved.   2. Recommened ultrasound guided biopsy of the left subareolar mass and left axillary lymph node.   3. Breast MRI could also further define multicentric disease on the left.   4. Clear suspicious finding on the right.  Recommend ongoing clinical follow up of palpable foci.          6/13/2014 -  Other Event     Diagnostic Mammo:  IMPRESSION:  1. History of left breast cancer and mastectomy. Stable benign right breast mammograms.          10/20/2014 Surgery     Final Diagnosis:  Biopsies, pertoneum:  Metastatic carcinoma, morphologically compatible with metastatic mammary carcinoma.          6/15/2015 -  Other Event     Diagnostic Mammo:  IMPRESSION:   1. Negative x-ray report, should not delay biopsy if a dominat or clinically suspicious mass is present.          7/5/2016 -  Other Event     Diagnositic mammogram:  Impression:   Stable right mamogram with no radiographic findings suspicious for malignancy. Recommend continued screening mammography per screening guidelines unless otherwise earlier clinically indicated.          9/18/2016 Initial Diagnosis     Secondary malignant neoplasm of bone and bone marrow                INTERVAL HISTORY  Patient ID: Heavenly Yanes is a 74 y.o. year old female Cancer Staging  Stage IV (T2, N1, M1)  --complaining of dyspnea on exersion since last Tuesday. She feels like she ran a mile  2/14/19: cxr showed pneumonia. Already on levofloxacin. Coughing not worse, she did not run temp.  Overall feels weak.  -restarted the appetite suppressant and is starting to eat more but not a whole lot  --2/28/19: CT c/ap with some response to therapy. New left chest inflammatory lesion to be followed ion next imaging. Bone scan unremarkable for progression.  4/25/19: she is much better since being off therapy for 2 weeks. COugh is improved.   6/20/19: has left ear drainage which is her usual ear infection. Leg edema improved with lasix. She was ordered a mammo by the surgeon which  I told her is not necessary, she has metastatic dz.    7/25/19: complaining of increased dyspnea on exertion. She takes her laxis daily.    8/22/19:  complaining of increased dyspnea on exertion. She takes her laxis daily. Also not much appetite even though on megace 40bid. YAIR not worse from prior. Weight the same  8/30/19: she presents today to review CT scan. Overall stable in terms of her cancer but there is a concerning are of extraluminal collection of air in the deep pelvis with a thickened wall measuring about 2.8 x 3.2 cm that is situated between the lower sigmoid colon and small bowel and just above the uterus. This likely represents a contained sigmoid colon perforation related to prior diverticulitis. Perforated neoplasm cannot be ruled out. GI consultation is recommended for this finding. The sigmoid colon and adjacent small bowel loops appear to be matted around this collection.  -11/7/19: main concern from  is lack of appetite. contnues to be fatigued.  11/15/19: appetite still same per  but he nags at her to eat more. Pt complaining of choking today and not able to swallow. Per , it has been happening for a while. Will refer to GI  -currently not having any abdominal symptoms.  -Denies cp/n/v/, diarrhea/fever/chills/neuropathy/ no focal weakness.   Rest of ros unremarkable. 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) 9/18/2016     Past Surgical History:   Past Surgical History:   Procedure Laterality Date   • BREAST SURGERY      left breast biopsy and axillary node   • CHOLECYSTECTOMY     • COLONOSCOPY  08/15/2014    diverticulosis  otherwise normal  colonoscopy   • EXTERNAL EAR SURGERY     • MASTECTOMY Left    • PORTACATH PLACEMENT       Social History:   Social History     Socioeconomic History   • Marital status:      Spouse name: Not on file   • Number of children: Not on file   • Years of education: Not on file   • Highest education level: Not on file   Tobacco Use   • Smoking status: Never Smoker   • Smokeless tobacco: Never Used   Substance and Sexual Activity   • Alcohol use: No   • Drug use: No   • Sexual activity: Defer     Family History:   Family History   Problem Relation Age of Onset   • No Known Problems Daughter    • No Known Problems Son    • Other Mother         complications from a concussion from a fall   • Other Father         old age   • No Known Problems Brother    • No Known Problems Daughter        Review of Systems   Constitutional: Positive for activity change, appetite change and fatigue. Negative for unexpected weight change.   HENT: Negative.    Eyes: Negative.    Respiratory: Positive for shortness of breath.    Cardiovascular: Positive for leg swelling.   Gastrointestinal: Negative.    Endocrine: Negative.    Genitourinary: Negative.    Musculoskeletal: Negative.    Skin: Negative.    Neurological: Negative.    Hematological: Negative.    Psychiatric/Behavioral: Negative.         Performance Status:  Asymptomatic    Medications:    Current Outpatient Medications   Medication Sig Dispense Refill   • Calcium-Magnesium-Vitamin D (CALCIUM 1200+D3 PO) Take 1,200 mg by mouth Daily.     • capecitabine (XELODA) 500 MG chemo tablet Take 1 tablet by mouth daily for 2 weeks on and 1 week off. 21 tablet 5   • ciprofloxacin (CIPRO) 500 MG tablet Take 1 tablet by mouth 2 (Two) Times a Day. 28 tablet 0   • furosemide (LASIX) 20 MG tablet One tab po qd as needed for swelling take with potassium 30 tablet 3   • megestrol (MEGACE) 40 MG tablet Take 1 tablet by mouth 2 (Two) Times a Day. 60 tablet 5   • metoprolol tartrate (LOPRESSOR) 25 MG  "tablet Take 25 mg by mouth 2 (two) times a day.     • ofloxacin (FLOXIN) 0.3 % otic solution      • OLANZapine (ZYPREXA) 2.5 MG tablet Take 2.5 mg by mouth every night.     • ondansetron (ZOFRAN) 8 MG tablet Take 1 tablet by mouth Every 8 (Eight) Hours As Needed for Nausea or Vomiting. 30 tablet 5   • potassium chloride (K-DUR,KLOR-CON) 20 MEQ CR tablet Take one tab po bid on days taking Lasix 60 tablet 1   • raNITIdine (ZANTAC) 150 MG tablet Take 1 tablet by mouth 2 (Two) Times a Day. 60 tablet 5   • rivaroxaban (XARELTO) 20 MG tablet Take 1 tablet by mouth Daily. 30 tablet 5   • thyroid 60 MG PO tablet Take 60 mg by mouth daily.       No current facility-administered medications for this visit.      Facility-Administered Medications Ordered in Other Visits   Medication Dose Route Frequency Provider Last Rate Last Dose   • heparin flush (porcine) 100 UNIT/ML injection 500 Units  500 Units Intravenous PRN Joseph Nunez MD   500 Units at 10/26/17 1155   • sodium chloride 0.9 % flush 10 mL  10 mL Intravenous PRN Joseph Nunez MD   10 mL at 10/26/17 1155       ALLERGIES:    Allergies   Allergen Reactions   • Other Angioedema     POPPY SEED   • Penicillins Hives   • Sulfa Antibiotics Mental Status Change   • Sulfur Mental Status Change   • Valium [Diazepam] Unknown (See Comments)     Unable to remember reaction   • Benadryl [Diphenhydramine Hcl (Sleep)] Other (See Comments)     Shaky legs   • Codeine Rash   • Diphenhydramine Other (See Comments)     \"Shaky leg syndrome\"  \"Shaky leg syndrome\"  \"Shaky leg syndrome\"   • Heparin Other (See Comments)     Patient states tolerates Heparin flush without problems: Years ago had heparin IV and had a rash   • Petroleum Jelly [Skin Protectants, Misc.] Rash       Objective      Vitals:    11/15/19 0911   BP: 111/68   Pulse: 87   Resp: 16   Temp: 98.5 °F (36.9 °C)   SpO2: 90%   Weight: 54.3 kg (119 lb 11.2 oz)   Height: 165.1 cm (65\")   PainSc:   2   PainLoc: Throat  Comment: got " choaked up         Current Status 11/15/2019   ECOG score 0         Physical Examunchanged  General Appearance: Patient is awake, alert, oriented and in no acute distress. Patient is welldeveloped, wellnourished, and appears stated age.  HEENT: Normocephalic. Sclerae clear, conjunctiva pink, extraocular movements intact, pupils, round, reactive to light and  accommodation. Mouth and throat are clear with moist oral mucosa.  NECK: Supple, no jugular venous distention, thyroid not enlarged.  LYMPH: No cervical, supraclavicular, axillary, or inguinal lymphadenopathy.  CHEST: Equal bilateral expansion, AP  diameter normal, resonant percussion note  LUNGS: Good air movement, no rales, rhonchi, rubs or wheezes with auscultation on the right. Diminished on the left.  CARDIO: Regular sinus rhythm, no murmurs, gallops or rubs.  ABDOMEN: Nondistended, soft, No tenderness, no guarding, no rebound, No hepatosplenomegaly. No abdominal masses. Bowel sounds positive. No hernia  GENITALIA: Not examined.  BREASTS: Not examined.  MUSKEL: No joint swelling, decreased motion, or inflammation  EXTREMS: patrick le  Edema L>R stable,No clubbing, cyanosis, No varicose veins.  NEURO: Grossly nonfocal, Gait is coordinated and smooth, Cognition is preserved.  SKIN: No rashes, no ecchymoses, no petechia.  PSYCH: Oriented to time, place and person. Memory is preserved. Mood and affect appear normal  RECENT LABS:  Lab on 11/15/2019   Component Date Value Ref Range Status   • Glucose 11/15/2019 106* 65 - 99 mg/dL Final   • BUN 11/15/2019 10  8 - 23 mg/dL Final   • Creatinine 11/15/2019 0.65  0.57 - 1.00 mg/dL Final   • Sodium 11/15/2019 138  136 - 145 mmol/L Final   • Potassium 11/15/2019 3.5  3.5 - 5.2 mmol/L Final   • Chloride 11/15/2019 100  98 - 107 mmol/L Final   • CO2 11/15/2019 31.0* 22.0 - 29.0 mmol/L Final   • Calcium 11/15/2019 9.4  8.6 - 10.5 mg/dL Final   • Total Protein 11/15/2019 6.5  6.0 - 8.5 g/dL Final   • Albumin 11/15/2019 3.40*  3.50 - 5.20 g/dL Final   • ALT (SGPT) 11/15/2019 10  1 - 33 U/L Final   • AST (SGOT) 11/15/2019 18  1 - 32 U/L Final   • Alkaline Phosphatase 11/15/2019 81  39 - 117 U/L Final   • Total Bilirubin 11/15/2019 0.6  0.2 - 1.2 mg/dL Final   • eGFR Non African Amer 11/15/2019 89  >60 mL/min/1.73 Final   • Globulin 11/15/2019 3.1  gm/dL Final   • A/G Ratio 11/15/2019 1.1  g/dL Final   • BUN/Creatinine Ratio 11/15/2019 15.4  7.0 - 25.0 Final   • Anion Gap 11/15/2019 7.0  5.0 - 15.0 mmol/L Final   • WBC 11/15/2019 4.92  3.40 - 10.80 10*3/mm3 Final   • RBC 11/15/2019 3.32* 3.77 - 5.28 10*6/mm3 Final   • Hemoglobin 11/15/2019 11.2* 12.0 - 15.9 g/dL Final   • Hematocrit 11/15/2019 32.8* 34.0 - 46.6 % Final   • MCV 11/15/2019 98.8* 79.0 - 97.0 fL Final   • MCH 11/15/2019 33.7* 26.6 - 33.0 pg Final   • MCHC 11/15/2019 34.1  31.5 - 35.7 g/dL Final   • RDW 11/15/2019 19.0* 12.3 - 15.4 % Final   • RDW-SD 11/15/2019 66.7* 37.0 - 54.0 fl Final   • MPV 11/15/2019 10.1  6.0 - 12.0 fL Final   • Platelets 11/15/2019 417  140 - 450 10*3/mm3 Final   • Neutrophil % 11/15/2019 63.7  42.7 - 76.0 % Final   • Lymphocyte % 11/15/2019 30.1  19.6 - 45.3 % Final   • Monocyte % 11/15/2019 3.0* 5.0 - 12.0 % Final   • Eosinophil % 11/15/2019 0.8  0.3 - 6.2 % Final   • Basophil % 11/15/2019 1.2  0.0 - 1.5 % Final   • Immature Grans % 11/15/2019 1.2* 0.0 - 0.5 % Final   • Neutrophils, Absolute 11/15/2019 3.13  1.70 - 7.00 10*3/mm3 Final   • Lymphocytes, Absolute 11/15/2019 1.48  0.70 - 3.10 10*3/mm3 Final   • Monocytes, Absolute 11/15/2019 0.15  0.10 - 0.90 10*3/mm3 Final   • Eosinophils, Absolute 11/15/2019 0.04  0.00 - 0.40 10*3/mm3 Final   • Basophils, Absolute 11/15/2019 0.06  0.00 - 0.20 10*3/mm3 Final   • Immature Grans, Absolute 11/15/2019 0.06* 0.00 - 0.05 10*3/mm3 Final   • nRBC 11/15/2019 0.0  0.0 - 0.2 /100 WBC Final       RADIOLOGY:  No results found.         Assessment/Plan  Heavenly Yanes is a 74 y.o. year old female with met breast cancer  Exclude Pending Lab, Cardiology, and Radiology orders from printing on the Patient's Discharge Instructions, due to Privacy Concerns. s/p multiple lines of CMT currently on taxotere weekly and xeloda with good tolerance.    Patient Active Problem List   Diagnosis   • Malignant neoplasm of upper-outer quadrant of left female breast (CMS/HCC)   • Breast cancer metastasized to bone, unspecified laterality (CMS/HCC)   • Essential hypertension   • Acquired hypothyroidism   • Bipolar 1 disorder (CMS/HCC)   • Carcinoma of left breast metastatic to bone (CMS/HCC)   • Antineoplastic chemotherapy induced anemia   • Dehydration   • Encounter for administration of vaccine   • Chemotherapy induced neutropenia (CMS/HCC)   • Cough   • Port-A-Cath in place   • Edema   • Hypokalemia          1.Metastatic breast ca: currently on weekly taxotere 3weeks on 1 week off.  --also on xeloda [id number], then repeat.  --CT c/a/p 2/19 showed stable dz. Some new Inflammatory opacities in the LEFT lower lobe and bilateral subpleural interstitial thickening.   -bone scan without evidence of dz progression  --primo taxotere weekly. Pt also on xeloda [id number], then repeat.  4/25/19: therapy postponed 2 weeks ago due to neutropenia. He got Neupogen x1. Labs reviewed with pt wbc 10.68, hg 11.1, [id number]. ANC 7630.  -ANC low 880. Postpone tx. Give neupogen x1. Pt advised to hold xeloda until 2 weeks    -imaging CT c/a/p 5/31/19 showed stable dz. Bone scan 5/31/19 showed multiple metastatic dz, nothing new. Possible pathologic fracture in the area of the coracoid process. Pt does not endorse pain there.  -labs 8/22/19 reviewed with pt wbc 4.99 Hg 11.7, plt 400. Ok for treatment with taxotere and neulasta.  -exam and review of labs did not reveal reason for the increased sob on exertion  -8/30/19: she presents today to review CT scan done 8:29/19. Overall stable in terms of her cancer but there is a concerning are of extraluminal collection of air in the deep pelvis with a thickened wall measuring about 2.8 x 3.2 cm that is situated between the lower sigmoid colon and small  bowel and just above the uterus. This likely represents a contained sigmoid colon perforation related to prior diverticulitis. Perforated neoplasm cannot be ruled out. GI consultation is recommended for this finding. The sigmoid colon and adjacent small bowel loops appear to be matted around this collection.  -currently not having any abdominal symptoms.  -will refer to GI  For dysphagia  -labs reviewed with pt and , cr 0.65, lft nl, wbc 4.92, hg 11.2, plt 417. Ok for doxetaxel today  rtc on the 5th.    2. Psych: on olanzapine    3. HTN: on metoprolol    4. Gerd: on ranitidine  5. Dyspnea: get a cxr to evaluate  6. Anemia: will check iron profile, and act accordingly . Her creatinine is normal at 0.94.   7. DVT on xarelto indefinite.  --Doppler left LE  1/30/19 There is evidence of deep venous thrombosis of the left lower extremity. The clot burden is improved from last study. There is also evidence of superficial clot in the greater saphenous vein. There is a Baker's cyst in the popliteal fossa.  8. CHF: LVEF 65% with diastolic dysfunction. referral to cardiology to maximized her heart function.  9. Thrombocytosis: suspect reactive from anemia. Will check iron profile.  10.left lung pneumonia: resolved  --cxr showed pneumonia. Pt already on levofloxacin. Will complete 14 days.   11: LE edema: improved on lasix.  12. FEN:  Low potassium 3.4. primo kdur daily  -reinforced small frequent meals and high calorie food such as peanut butter, ice cream etc.      Moise Sotelo MD    11/15/2019    9:50 AM

## 2021-07-03 NOTE — ED PROVIDER NOTE - PATIENT PORTAL LINK FT
You can access the FollowMyHealth Patient Portal offered by [facility] by registering at the following website: [email or web address]. By joining [facility]’s FollowMyHealth portal, you will also be able to view your health information using other applications (apps) compatible with our system.

## 2021-07-03 NOTE — ED PROVIDER NOTE - NSFOLLOWUPCLINICS_GEN_ALL_ED_FT
[facility]  Family Medicine  [address] [place] [address]  Phone: [phone]  Fax:   Follow Up Time: 1-3 Days

## 2021-07-03 NOTE — ED PROVIDER NOTE - PHYSICAL EXAMINATION
Constitutional: NAD AAOx3  Eyes: PERRLA EOMI  Head: Normocephalic atraumatic  ENT: normal posterior pharynx uvula midline no signs of pta full rom of neck no meningismus  Mouth: MMM  Cardiac: regular rate   Resp: Lungs CTAB  GI: Abd s/nt/nd  Neuro: CN2-12 intact  Skin: No rashes Constitutional: NAD AAOx3  Eyes: PERRLA EOMI  Head: Normocephalic atraumatic  ENT: normal posterior pharynx uvula midline no signs of pta full rom of neck no meningismus no posterior pharynx erythema or exudates no carotid bruit no tenderness to lateral neck over jugulars  Mouth: MMM  Cardiac: regular rate   Resp: Lungs CTAB  GI: Abd s/nt/nd  Neuro: CN2-12 intact  Skin: No rashes

## 2021-07-03 NOTE — ED ADULT TRIAGE NOTE - CHIEF COMPLAINT QUOTE
pt presents to ED with complaints of SOB and "feeling like my throat is closing." pt unable to speak to RN in triage, but was typing information on phone for RN. O2 sat 97% in triage. pt brought straight to trauma room for further eval.

## 2021-07-03 NOTE — ED ADULT TRIAGE NOTE - PRO INTERPRETER NEED 2
[FreeTextEntry1] : Patient referred by Dr. Almeida for evaluation of left breast mass. Patient had a left breast mass with left lateral breast pain August 2019. Underwent biopsy of left breast 2:00 mass 3.2 CM fibroadenoma. Left axillary lymph node benign pathology. Patient feels this has increased in size. Left mammogram and ultrasound performed earlier today, results not available. Patient denies nipple discharge or family history of breast cancer.\par 9/4/2020 left breast excision, path benign fibroadenoma, denies pain or swelling. incision healed, reports itching at incision last week with small "ball" under sking, deneies trauama, fever or pain. 
English

## 2021-07-03 NOTE — ED PROVIDER NOTE - NSFOLLOWUPINSTRUCTIONS_ED_ALL_ED_FT
1. return for worsening symptoms or anything concerning to you  2. take all home meds as prescribed  3. follow up with your pmd call to make an appointment      Pharyngitis    WHAT YOU NEED TO KNOW:    Pharyngitis, or sore throat, is inflammation of the tissues and structures in your pharynx (throat). Pharyngitis is most often caused by bacteria. It may also be caused by a cold or flu virus. Other causes include smoking, allergies, or acid reflux.     DISCHARGE INSTRUCTIONS:    Call 911 for any of the following:   •You have trouble breathing or swallowing because your throat is swollen or sore.          Return to the emergency department if:   •You are drooling because it hurts too much to swallow.      •Your fever is higher than 102°F (39°C) or lasts longer than 3 days.      •You are confused.      •You taste blood in your throat.      Contact your healthcare provider if:   •Your throat pain gets worse.      •You have a painful lump in your throat that does not go away after 5 days.      •Your symptoms do not improve after 5 days.      •You have questions or concerns about your condition or care.      Medicines: Viral pharyngitis will go away on its own without treatment. Your sore throat should start to feel better in 3 to 5 days for both viral and bacterial infections. You may need any of the following:   •Antibiotics treat a bacterial infection.      •NSAIDs, such as ibuprofen, help decrease swelling, pain, and fever. NSAIDs can cause stomach bleeding or kidney problems in certain people. If you take blood thinner medicine, always ask your healthcare provider if NSAIDs are safe for you. Always read the medicine label and follow directions.      •Acetaminophen decreases pain and fever. It is available without a doctor's order. Ask how much to take and how often to take it. Follow directions. Acetaminophen can cause liver damage if not taken correctly.      •Take your medicine as directed. Contact your healthcare provider if you think your medicine is not helping or if you have side effects. Tell him or her if you are allergic to any medicine. Keep a list of the medicines, vitamins, and herbs you take. Include the amounts, and when and why you take them. Bring the list or the pill bottles to follow-up visits. Carry your medicine list with you in case of an emergency.      Manage your symptoms:   •Gargle salt water. Mix ¼ teaspoon salt in an 8 ounce glass of warm water and gargle. This may help decrease swelling in your throat.      •Drink liquids as directed. You may need to drink more liquids than usual. Liquids may help soothe your throat and prevent dehydration. Ask how much liquid to drink each day and which liquids are best for you.      •Use a cool-steam humidifier to help moisten the air in your room and calm your cough.      •Soothe your throat with cough drops, ice, soft foods, or popsicles.      Prevent the spread of pharyngitis: Cover your mouth and nose when you cough or sneeze. Do not share food or drinks. Wash your hands often. Use soap and water. If soap and water are unavailable, use an alcohol based hand .     Follow up with your healthcare provider as directed: Write down your questions so you remember to ask them during your visits

## 2021-07-03 NOTE — ED PROVIDER NOTE - CARE PROVIDER_API CALL
Hans Vincent)  Gastroenterology; Internal Medicine  [address] [place] [address]  Phone: [phone]  Fax: [phone]  Follow Up Time: 4-6 Days   alone

## 2021-07-03 NOTE — ED PROVIDER NOTE - NS ED ROS FT
Constitutional: No fever or chills  Eyes: No visual changes  HEENT: + throat pain + difficulty swallowing  CV: No chest pain  Resp: No SOB no cough  GI: No abd pain, nausea or vomiting  : No dysuria  MSK: No musculoskeletal pain  Skin: No rash  Neuro: No headache

## 2021-07-03 NOTE — ED PROVIDER NOTE - OBJECTIVE STATEMENT
21M hx GERD MDD drug abuse SI presents to the ED for throat pain. pt states he was out drinking and smoking until 3am last night. states that he woke up around 1pm and felt tightness in his throat. tried swallowing and felt pain and it was worse with speaking so came to the ED. Here pt is able to speak in full sentences, feels voice is raspy. denies drooling trismus neck pain SOB cp abd pain nausea or vomiting. no skin rash or itching. no new foods detergents soaps. pt states he woke up with these symptoms. no trauma. didn't eat anything prior to symptoms so no concern for food impaction.

## 2021-07-03 NOTE — ED PROVIDER NOTE - CLINICAL SUMMARY MEDICAL DECISION MAKING FREE TEXT BOX
21M hx GERD MDD drug abuse SI presents to the ED for throat pain. pt states he was out drinking and smoking until 3am last night. states that he woke up around 1pm and felt tightness in his throat. tried swallowing and felt pain and it was worse with speaking so came to the ED. Here pt is able to speak in full sentences, feels voice is raspy. denies drooling trismus neck pain SOB cp abd pain nausea or vomiting. no skin rash or itching. no new foods detergents soaps. pt states he woke up with these symptoms. no trauma. didn't eat anything prior to symptoms so no concern for food impaction. exam non-focal. extremely unlikely this is allergic as pt woke up with symptoms no inciting event meds foods or allergens an no other symptoms bedsides throat pain. no concern for food impaction as didn't eat food prior to this. no signs of infection. likely throat irritation from smoking a lot last night as well as some vomiting and hx gerd. will symptom control and reassess. Hans Reyes M.D., Attending Physician 21M hx GERD MDD drug abuse SI presents to the ED for throat pain. pt states he was out drinking and smoking until 3am last night. states that he woke up around 1pm and felt tightness in his throat. tried swallowing and felt pain and it was worse with speaking so came to the ED. Here pt is able to speak in full sentences, feels voice is raspy. denies drooling trismus neck pain SOB cp abd pain nausea or vomiting. no skin rash or itching. no new foods detergents soaps. pt states he woke up with these symptoms. no trauma. didn't eat anything prior to symptoms so no concern for food impaction. exam non-focal. extremely unlikely this is allergic as pt woke up with symptoms no inciting event meds foods or allergens an no other symptoms bedsides throat pain. no concern for food impaction as didn't eat food prior to this. no signs of infection such as pta rpa meningitis lemirres ludwigs. likely throat irritation from smoking a lot last night as well as some vomiting and hx gerd. will symptom control and reassess. Hans Reyes M.D., Attending Physician

## 2021-12-19 ENCOUNTER — EMERGENCY (EMERGENCY)
Facility: HOSPITAL | Age: 21
LOS: 0 days | Discharge: LEFT BEFORE DISCHARGE | End: 2021-12-19
Payer: SELF-PAY

## 2021-12-19 VITALS — HEIGHT: 70 IN | WEIGHT: 175.05 LBS

## 2021-12-19 DIAGNOSIS — R19.7 DIARRHEA, UNSPECIFIED: ICD-10-CM

## 2021-12-19 DIAGNOSIS — Z53.21 PROCEDURE AND TREATMENT NOT CARRIED OUT DUE TO PATIENT LEAVING PRIOR TO BEING SEEN BY HEALTH CARE PROVIDER: ICD-10-CM

## 2021-12-19 DIAGNOSIS — R11.2 NAUSEA WITH VOMITING, UNSPECIFIED: ICD-10-CM

## 2021-12-19 DIAGNOSIS — R10.9 UNSPECIFIED ABDOMINAL PAIN: ICD-10-CM

## 2021-12-19 PROCEDURE — L9991: CPT

## 2022-06-08 ENCOUNTER — EMERGENCY (EMERGENCY)
Facility: HOSPITAL | Age: 22
LOS: 0 days | Discharge: HOME | End: 2022-06-08
Attending: EMERGENCY MEDICINE
Payer: SELF-PAY

## 2022-06-08 VITALS
WEIGHT: 171.96 LBS | HEIGHT: 70 IN | DIASTOLIC BLOOD PRESSURE: 79 MMHG | RESPIRATION RATE: 17 BRPM | OXYGEN SATURATION: 100 % | TEMPERATURE: 100 F | SYSTOLIC BLOOD PRESSURE: 121 MMHG | HEART RATE: 93 BPM

## 2022-06-08 VITALS — TEMPERATURE: 98 F

## 2022-06-08 DIAGNOSIS — R10.9 UNSPECIFIED ABDOMINAL PAIN: ICD-10-CM

## 2022-06-08 DIAGNOSIS — Z20.822 CONTACT WITH AND (SUSPECTED) EXPOSURE TO COVID-19: ICD-10-CM

## 2022-06-08 DIAGNOSIS — R11.2 NAUSEA WITH VOMITING, UNSPECIFIED: ICD-10-CM

## 2022-06-08 DIAGNOSIS — F32.9 MAJOR DEPRESSIVE DISORDER, SINGLE EPISODE, UNSPECIFIED: ICD-10-CM

## 2022-06-08 DIAGNOSIS — K21.9 GASTRO-ESOPHAGEAL REFLUX DISEASE WITHOUT ESOPHAGITIS: ICD-10-CM

## 2022-06-08 LAB
ALBUMIN SERPL ELPH-MCNC: 4.1 G/DL — SIGNIFICANT CHANGE UP (ref 3.3–5)
ALP SERPL-CCNC: 89 U/L — SIGNIFICANT CHANGE UP (ref 40–120)
ALT FLD-CCNC: 28 U/L — SIGNIFICANT CHANGE UP (ref 12–78)
ANION GAP SERPL CALC-SCNC: 6 MMOL/L — SIGNIFICANT CHANGE UP (ref 5–17)
APPEARANCE UR: CLEAR — SIGNIFICANT CHANGE UP
AST SERPL-CCNC: 25 U/L — SIGNIFICANT CHANGE UP (ref 15–37)
BASOPHILS # BLD AUTO: 0.05 K/UL — SIGNIFICANT CHANGE UP (ref 0–0.2)
BASOPHILS NFR BLD AUTO: 0.3 % — SIGNIFICANT CHANGE UP (ref 0–2)
BILIRUB SERPL-MCNC: 0.4 MG/DL — SIGNIFICANT CHANGE UP (ref 0.2–1.2)
BILIRUB UR-MCNC: NEGATIVE — SIGNIFICANT CHANGE UP
BUN SERPL-MCNC: 13 MG/DL — SIGNIFICANT CHANGE UP (ref 7–23)
CALCIUM SERPL-MCNC: 9.1 MG/DL — SIGNIFICANT CHANGE UP (ref 8.5–10.1)
CHLORIDE SERPL-SCNC: 104 MMOL/L — SIGNIFICANT CHANGE UP (ref 96–108)
CO2 SERPL-SCNC: 27 MMOL/L — SIGNIFICANT CHANGE UP (ref 22–31)
COLOR SPEC: YELLOW — SIGNIFICANT CHANGE UP
CREAT SERPL-MCNC: 0.78 MG/DL — SIGNIFICANT CHANGE UP (ref 0.5–1.3)
DIFF PNL FLD: NEGATIVE — SIGNIFICANT CHANGE UP
EGFR: 129 ML/MIN/1.73M2 — SIGNIFICANT CHANGE UP
EOSINOPHIL # BLD AUTO: 0.27 K/UL — SIGNIFICANT CHANGE UP (ref 0–0.5)
EOSINOPHIL NFR BLD AUTO: 1.9 % — SIGNIFICANT CHANGE UP (ref 0–6)
ETHANOL SERPL-MCNC: <10 MG/DL — SIGNIFICANT CHANGE UP (ref 0–10)
GLUCOSE SERPL-MCNC: 91 MG/DL — SIGNIFICANT CHANGE UP (ref 70–99)
GLUCOSE UR QL: NEGATIVE — SIGNIFICANT CHANGE UP
HCT VFR BLD CALC: 39.3 % — SIGNIFICANT CHANGE UP (ref 39–50)
HGB BLD-MCNC: 14 G/DL — SIGNIFICANT CHANGE UP (ref 13–17)
IMM GRANULOCYTES NFR BLD AUTO: 0.3 % — SIGNIFICANT CHANGE UP (ref 0–1.5)
KETONES UR-MCNC: ABNORMAL
LEUKOCYTE ESTERASE UR-ACNC: ABNORMAL
LIDOCAIN IGE QN: 82 U/L — SIGNIFICANT CHANGE UP (ref 73–393)
LYMPHOCYTES # BLD AUTO: 1.46 K/UL — SIGNIFICANT CHANGE UP (ref 1–3.3)
LYMPHOCYTES # BLD AUTO: 10.2 % — LOW (ref 13–44)
MCHC RBC-ENTMCNC: 32.8 PG — SIGNIFICANT CHANGE UP (ref 27–34)
MCHC RBC-ENTMCNC: 35.6 GM/DL — SIGNIFICANT CHANGE UP (ref 32–36)
MCV RBC AUTO: 92 FL — SIGNIFICANT CHANGE UP (ref 80–100)
MONOCYTES # BLD AUTO: 1.22 K/UL — HIGH (ref 0–0.9)
MONOCYTES NFR BLD AUTO: 8.5 % — SIGNIFICANT CHANGE UP (ref 2–14)
NEUTROPHILS # BLD AUTO: 11.33 K/UL — HIGH (ref 1.8–7.4)
NEUTROPHILS NFR BLD AUTO: 78.8 % — HIGH (ref 43–77)
NITRITE UR-MCNC: NEGATIVE — SIGNIFICANT CHANGE UP
PCP SPEC-MCNC: SIGNIFICANT CHANGE UP
PH UR: 8 — SIGNIFICANT CHANGE UP (ref 5–8)
PLATELET # BLD AUTO: 236 K/UL — SIGNIFICANT CHANGE UP (ref 150–400)
POTASSIUM SERPL-MCNC: 3.5 MMOL/L — SIGNIFICANT CHANGE UP (ref 3.5–5.3)
POTASSIUM SERPL-SCNC: 3.5 MMOL/L — SIGNIFICANT CHANGE UP (ref 3.5–5.3)
PROT SERPL-MCNC: 7.3 GM/DL — SIGNIFICANT CHANGE UP (ref 6–8.3)
PROT UR-MCNC: 15
RBC # BLD: 4.27 M/UL — SIGNIFICANT CHANGE UP (ref 4.2–5.8)
RBC # FLD: 12.5 % — SIGNIFICANT CHANGE UP (ref 10.3–14.5)
SODIUM SERPL-SCNC: 137 MMOL/L — SIGNIFICANT CHANGE UP (ref 135–145)
SP GR SPEC: 1.01 — SIGNIFICANT CHANGE UP (ref 1.01–1.02)
UROBILINOGEN FLD QL: 1
WBC # BLD: 14.37 K/UL — HIGH (ref 3.8–10.5)
WBC # FLD AUTO: 14.37 K/UL — HIGH (ref 3.8–10.5)

## 2022-06-08 PROCEDURE — 85025 COMPLETE CBC W/AUTO DIFF WBC: CPT

## 2022-06-08 PROCEDURE — 83690 ASSAY OF LIPASE: CPT

## 2022-06-08 PROCEDURE — 99053 MED SERV 10PM-8AM 24 HR FAC: CPT

## 2022-06-08 PROCEDURE — 99284 EMERGENCY DEPT VISIT MOD MDM: CPT

## 2022-06-08 PROCEDURE — 80053 COMPREHEN METABOLIC PANEL: CPT

## 2022-06-08 PROCEDURE — 36415 COLL VENOUS BLD VENIPUNCTURE: CPT

## 2022-06-08 PROCEDURE — 99285 EMERGENCY DEPT VISIT HI MDM: CPT | Mod: 25

## 2022-06-08 PROCEDURE — 80307 DRUG TEST PRSMV CHEM ANLYZR: CPT

## 2022-06-08 PROCEDURE — 96375 TX/PRO/DX INJ NEW DRUG ADDON: CPT

## 2022-06-08 PROCEDURE — 87635 SARS-COV-2 COVID-19 AMP PRB: CPT

## 2022-06-08 PROCEDURE — 81001 URINALYSIS AUTO W/SCOPE: CPT

## 2022-06-08 PROCEDURE — 82962 GLUCOSE BLOOD TEST: CPT

## 2022-06-08 PROCEDURE — 96374 THER/PROPH/DIAG INJ IV PUSH: CPT

## 2022-06-08 RX ORDER — KETOROLAC TROMETHAMINE 30 MG/ML
30 SYRINGE (ML) INJECTION ONCE
Refills: 0 | Status: DISCONTINUED | OUTPATIENT
Start: 2022-06-08 | End: 2022-06-08

## 2022-06-08 RX ORDER — METOCLOPRAMIDE HCL 10 MG
10 TABLET ORAL ONCE
Refills: 0 | Status: COMPLETED | OUTPATIENT
Start: 2022-06-08 | End: 2022-06-08

## 2022-06-08 RX ORDER — ONDANSETRON 8 MG/1
4 TABLET, FILM COATED ORAL ONCE
Refills: 0 | Status: COMPLETED | OUTPATIENT
Start: 2022-06-08 | End: 2022-06-08

## 2022-06-08 RX ORDER — FAMOTIDINE 10 MG/ML
20 INJECTION INTRAVENOUS ONCE
Refills: 0 | Status: COMPLETED | OUTPATIENT
Start: 2022-06-08 | End: 2022-06-08

## 2022-06-08 RX ORDER — SODIUM CHLORIDE 9 MG/ML
1000 INJECTION INTRAMUSCULAR; INTRAVENOUS; SUBCUTANEOUS ONCE
Refills: 0 | Status: COMPLETED | OUTPATIENT
Start: 2022-06-08 | End: 2022-06-08

## 2022-06-08 RX ORDER — LIDOCAINE 4 G/100G
10 CREAM TOPICAL ONCE
Refills: 0 | Status: COMPLETED | OUTPATIENT
Start: 2022-06-08 | End: 2022-06-08

## 2022-06-08 RX ORDER — ACETAMINOPHEN 500 MG
1000 TABLET ORAL ONCE
Refills: 0 | Status: COMPLETED | OUTPATIENT
Start: 2022-06-08 | End: 2022-06-08

## 2022-06-08 RX ADMIN — Medication 30 MILLIGRAM(S): at 02:21

## 2022-06-08 RX ADMIN — SODIUM CHLORIDE 1000 MILLILITER(S): 9 INJECTION INTRAMUSCULAR; INTRAVENOUS; SUBCUTANEOUS at 03:35

## 2022-06-08 RX ADMIN — Medication 30 MILLILITER(S): at 02:22

## 2022-06-08 RX ADMIN — Medication 30 MILLIGRAM(S): at 01:35

## 2022-06-08 RX ADMIN — Medication 10 MILLIGRAM(S): at 03:18

## 2022-06-08 RX ADMIN — ONDANSETRON 4 MILLIGRAM(S): 8 TABLET, FILM COATED ORAL at 02:21

## 2022-06-08 RX ADMIN — FAMOTIDINE 20 MILLIGRAM(S): 10 INJECTION INTRAVENOUS at 02:21

## 2022-06-08 RX ADMIN — SODIUM CHLORIDE 1000 MILLILITER(S): 9 INJECTION INTRAMUSCULAR; INTRAVENOUS; SUBCUTANEOUS at 02:22

## 2022-06-08 RX ADMIN — LIDOCAINE 10 MILLILITER(S): 4 CREAM TOPICAL at 02:22

## 2022-06-08 NOTE — ED PROVIDER NOTE - NS_EDPROVIDERDISPOUSERTYPE_ED_A_ED
How Severe Is Your Skin Lesion?: moderate Has Your Skin Lesion Been Treated?: not been treated Is This A New Presentation, Or A Follow-Up?: Skin Lesion Attending Attestation (For Attendings USE Only)...

## 2022-06-08 NOTE — ED PROVIDER NOTE - OBJECTIVE STATEMENT
22 y/o male in ED c/o nausea but unable to vomit today.   pt states feels emesis stuck in his esophagus .   tolerating PO.   no sick contacts.   pt states no meds taken.   denies any fever.  denies any drug use.   states did drink alcohol yesterday

## 2022-06-08 NOTE — ED ADULT NURSE NOTE - OBJECTIVE STATEMENT
Pt presents to the ED w/ c/o abdominal discomfort, n/v and chest congestion for approximately the last 4 - 5 hours as per pt. VSS. Pt aox4, no s&s of distress noted. Pt reports feeling sob r/t nausea. Denies chest pain, sob, HA at present time. WCTM.

## 2022-06-08 NOTE — ED PROVIDER NOTE - PATIENT PORTAL LINK FT
You can access the FollowMyHealth Patient Portal offered by [facility] by registering at the following website: [email or web address]. By joining [employer]’s FollowMyHealth portal, you will also be able to view your health information using other applications (apps) compatible with our system.

## 2022-06-08 NOTE — ED ADULT TRIAGE NOTE - GLASGOW COMA SCALE: EYE OPENING, MLM
-s/p transphenoidal resection of pituitary, c/b csf leak intraop, s/p fat graft  -care per neurosurgery, ENT following  -MRI postop reviewed: no gross residual tumor  -off aspirin or pharm vte ppx given bleeding risk; would resume aspirin when able to per neurosurgery  -nasal saline per ENT
(E4) spontaneous

## 2022-06-08 NOTE — ED ADULT NURSE NOTE - NSIMPLEMENTINTERV_GEN_ALL_ED
Implemented All Universal Safety Interventions:  [place] to call system. Call bell, personal items and telephone within reach. Instruct patient to call for assistance. Room bathroom lighting operational. Non-slip footwear when patient is off stretcher. Physically safe environment: no spills, clutter or unnecessary equipment. Stretcher in lowest position, wheels locked, appropriate side rails in place.
no

## 2022-06-08 NOTE — ED ADULT TRIAGE NOTE - CHIEF COMPLAINT QUOTE
c/o vomiting x 1 day. Dry heaving in triage. Sts throat feels like its closing and like "my vomit gets stuck." Pt sts almost passed out while taking shower today. Denies sick contacts. Denies CP. Sts mild SOB from the vomiting.

## 2022-06-27 NOTE — ED PEDIATRIC NURSE NOTE - CHIEF COMPLAINT
-- DO NOT REPLY / DO NOT REPLY ALL --  -- Message is from the [facility]--    General Patient Message      Reason for Call: the patient is asking if he'll have to fast for any labs for his physical visit 7/1/22?  Please advise, thanks    Caller Information       Type Contact Phone/Fax    06/27/2022 09:12 AM CDT Phone (Incoming) Heath Abdullahi (Self) [phone] (W)          Alternative phone number:n/a    Turnaround time given to caller:   \"This message will be sent to [state Provider's name]. The clinical team will fulfill your request as soon as they review your message.\"     The patient is a 16y Male complaining of suicidal thoughts.

## 2022-09-07 NOTE — ED ADULT NURSE NOTE - PAIN RATING/NUMBER SCALE (0-10): REST
Patient Education       Well Child Exam 15 Months   About this topic   Your child's 15-month well child exam is a visit with the doctor to check your child's health. The doctor measures your child's weight, height, and head size. The doctor plots these numbers on a growth curve. The growth curve gives a picture of your child's growth at each visit. The doctor may listen to your child's heart, lungs, and belly. Your doctor will do a full exam of your child from the head to the toes.  Your child may also need shots or blood tests during this visit.  General   Growth and Development   Your doctor will ask you how your child is developing. The doctor will focus on the skills that most children your child's age are expected to do. During this time of your child's life, here are some things you can expect.  Movement - Your child may:  Walk well without help  Use a crayon to scribble or make marks  Able to stack three blocks  Explore places and things  Imitate your actions  Hearing, seeing, and talking - Your child will likely:  Have 3 or 5 other words  Be able to follow simple directions and point to a body part when asked  Begin to have a preference for certain activities, and strong dislikes for others  Want your love and praise. Hug your child and say I love you often. Say thank you when your child does something nice.  Begin to understand no. Try to distract or redirect to correct your child.  Begin to have temper tantrums. Ignore them if possible.  Feeding - Your child:  Should drink whole milk until 2 years old  Is ready to give up the bottle and drink from a cup or sippy cup  Will be eating 3 meals and 2 to 3 snacks a day. However, your child may eat less than before and this is normal.  Should be given a variety of healthy foods with different textures. Let your child decide how much to eat.  Should be able to eat without help. May be able to use a spoon or fork but probably prefers finger foods.  Should avoid  foods that might cause choking like grapes, popcorn, hot dogs, or hard candy.  Should have no fruit juice most days and no more than 4 ounces (120 mL) of fruit juice a day  Will need you to clean the teeth after a feeding with a wet washcloth or a wet child's toothbrush. You may use a smear of toothpaste with fluoride in it 2 times each day.  Sleep - Your child:  Should still sleep in a safe crib. Your child may be ready to sleep in a toddler bed if climbing out of the crib after naps or in the morning.  Is likely sleeping about 10 to 15 hours in a row at night  Needs 1 to 2 naps each day  Sleeps about a total of 14 hours each day  Should be able to fall asleep without help. If your child wakes up at night, check on your child. Do not pick your child up, offer a bottle, or play with your child. Doing these things will not help your child fall asleep without help.  Should not have a bottle in bed. This can cause tooth decay or ear infections.  Vaccines - It is important for your child to get shots on time. This protects from very serious illnesses like lung infections, meningitis, or infections that harm the nervous system. Your baby may also need a flu shot. Check with your doctor to make sure your baby's shots are up to date. Your child may need:  DTaP or diphtheria, tetanus, and pertussis vaccine  Hib or  Haemophilus influenzae type b vaccine  PCV or pneumococcal conjugate vaccine  MMR or measles, mumps, and rubella vaccine  Varicella or chickenpox vaccine  Hep A or hepatitis A vaccine  Flu or influenza vaccine  Your child may get some of these combined into one shot. This lowers the number of shots your child may get and yet keeps them protected.  Help for Parents   Play with your child.  Go outside as often as you can.  Give your child soft balls, blocks, and containers to play with. Toys that can be stacked or nest inside of one another are also good.  Cars, trains, and toys to push, pull, or walk behind are  fun. So are puzzles and animal or people figures.  Help your child pretend. Use an empty cup to take a drink. Push a block and make sounds like it is a car or a boat.  Read to your child. Name the things in the pictures in the book. Talk and sing to your child. This helps your child learn language skills.  Here are some things you can do to help keep your child safe and healthy.  Do not allow anyone to smoke in your home or around your child.  Have the right size car seat for your child and use it every time your child is in the car. Your child should be rear facing until 2 years of age.  Be sure furniture, shelves, and televisions are secure and cannot tip over onto your child.  Take extra care around water. Close bathroom doors. Never leave your child in the tub alone.  Never leave your child alone. Do not leave your child in the car, in the bath, or at home alone, even for a few minutes.  Avoid long exposure to direct sunlight by keeping your child in the shade. Use sunscreen if shade is not possible.  Protect your child from gun injuries. If you have a gun, use a trigger lock. Keep the gun locked up and the bullets kept in a separate place.  Avoid screen time for children under 2 years old. This means no TV, computers, or video games. They can cause problems with brain development.  Parents need to think about:  Having emergency numbers, including poison control, in your phone or posted near the phone  How to distract your child when doing something you dont want your child to do  Using positive words to tell your child what you want, rather than saying no or what not to do  Your next well child visit will most likely be when your child is 18 months old. At this visit your doctor may:  Do a full check up on your child  Talk about making sure your home is safe for your child, how well your child is eating, and how to correct your child  Give your child the next set of shots  When do I need to call the doctor?    Fever of 100.4°F (38°C) or higher  Sleeps all the time or has trouble sleeping  Won't stop crying  You are worried about your child's development  Last Reviewed Date   2021  Consumer Information Use and Disclaimer   This information is not specific medical advice and does not replace information you receive from your health care provider. This is only a brief summary of general information. It does NOT include all information about conditions, illnesses, injuries, tests, procedures, treatments, therapies, discharge instructions or life-style choices that may apply to you. You must talk with your health care provider for complete information about your health and treatment options. This information should not be used to decide whether or not to accept your health care providers advice, instructions or recommendations. Only your health care provider has the knowledge and training to provide advice that is right for you.  Copyright   Copyright © 2021 UpToDate, Inc. and its affiliates and/or licensors. All rights reserved.    Children under the age of 2 years will be restrained in a rear facing child safety seat.   If you have an active [facility] account, please look for your well child questionnaire to come to your [facility] account before your next well child visit.   8

## 2022-10-03 ENCOUNTER — EMERGENCY (EMERGENCY)
Facility: HOSPITAL | Age: 22
LOS: 1 days | Discharge: HOME | End: 2022-10-03
Admitting: EMERGENCY MEDICINE

## 2022-10-03 VITALS
OXYGEN SATURATION: 97 % | HEART RATE: 88 BPM | SYSTOLIC BLOOD PRESSURE: 156 MMHG | DIASTOLIC BLOOD PRESSURE: 51 MMHG | WEIGHT: 169.98 LBS | RESPIRATION RATE: 16 BRPM | TEMPERATURE: 99 F | HEIGHT: 70 IN

## 2022-10-03 VITALS
OXYGEN SATURATION: 97 % | DIASTOLIC BLOOD PRESSURE: 68 MMHG | HEART RATE: 80 BPM | RESPIRATION RATE: 16 BRPM | SYSTOLIC BLOOD PRESSURE: 140 MMHG

## 2022-10-03 LAB — SARS-COV-2 RNA SPEC QL NAA+PROBE: SIGNIFICANT CHANGE UP

## 2022-10-03 PROCEDURE — 99283 EMERGENCY DEPT VISIT LOW MDM: CPT

## 2022-10-03 NOTE — ED PROVIDER NOTE - CLINICAL SUMMARY MEDICAL DECISION MAKING FREE TEXT BOX
well appearing, NAD, with URI symptoms, looks well, no vitals derangements, tolerating po. most likely viral syndrome, advised supportive care, will order covid-19 pcr, f/u pmd.

## 2022-10-03 NOTE — ED PROVIDER NOTE - PHYSICAL EXAMINATION
CONSTITUTIONAL: Well-appearing; well-nourished; in no apparent distress.   	HEAD: Normocephalic; atraumatic.   	EYES:  conjunctiva and sclera clear  tympanic membranes clear bilaterally, No redness, normal landmarks and light reflexes, canal clear without cerumen impaction   	ENT: normal nose; no rhinorrhea; normal pharynx with no erythema or lesions.   	NECK: Supple; non-tender;   	CARDIOVASCULAR: Normal S1, S2; no murmurs, rubs, or gallops. Regular rate and rhythm.   	RESPIRATORY: Breathing easily; breath sounds clear and equal bilaterally; no wheezes, rhonchi, or rales.  	GI: Soft; non-distended; non-tender  	EXT: BEASLEY x 4.   	SKIN: Normal for age and race; warm; dry; good turgor; no apparent lesions or rash.   	NEURO: A & O x 3; face symmetric; grossly unremarkable.   PSYCHOLOGICAL: The patient’s mood and manner are appropriate.

## 2022-10-03 NOTE — ED PROVIDER NOTE - OBJECTIVE STATEMENT
21 yo male with no sig pmhx presents c/o body aches, cough, feeling tired, nasal congestion x 1 week. subjective fever. no recent travel or known sick contact exposure. vaccinated for covid-19 with j&j

## 2022-10-03 NOTE — ED ADULT TRIAGE NOTE - CHIEF COMPLAINT QUOTE
male patient presenting with multiple complaints x 10 weeks. patient states he has been feeling lethargic, general aches and pains, difficulty sleeping stuffed sinuses and mild headache. VSS stable in triage. patient denies.

## 2022-10-03 NOTE — ED PROVIDER NOTE - PATIENT PORTAL LINK FT
You can access the FollowMyHealth Patient Portal offered by [facility] by registering at the following website: [email or web address]. By joining [employer]’s FollowMyHealth portal, you will also be able to view your health information using other applications (apps) compatible with our system.

## 2022-10-03 NOTE — ED PROVIDER NOTE - NSFOLLOWUPINSTRUCTIONS_ED_ALL_ED_FT
Viral Respiratory Infection    A viral respiratory infection is an illness that affects parts of the body used for breathing, like the lungs, nose, and throat. It is caused by a germ called a virus. Symptoms can include runny nose, coughing, sneezing, fatigue, body aches, sore throat, fever, or headache. Over the counter medicine can be used to manage the symptoms but the infection typically goes away on its own in 5 to 10 days.     Follow up with your primary care doctor or clinics listed below if [place] [address]  To make an appointment, call [phone]  [facility]  Address: [address] [place] [address]  Appointment Center: [phone] ([phone])     SEEK IMMEDIATE MEDICAL CARE IF YOU HAVE ANY OF THE FOLLOWING SYMPTOMS: shortness of breath, chest pain, fever over 10 days, or lightheadedness/dizziness.

## 2022-10-03 NOTE — ED ADULT NURSE NOTE - OBJECTIVE STATEMENT
presents to the ED c/o cold-like symptoms. reports body aches, chills, subjective fevers, malaise, fatigue, congestion at home for the past week. Unknown sick contacts, reports covid negative a week prior when symptoms began. Endorses only dayquil use with minimal relief found. No acute respiratory distress noted, symmetrical bilateral chest rise and fall noted, respirations unlabored and easy, able to speak in full sentences without respiratory distress, no cough, wheezing, congestion noted at this time. Denies any difficulty in breathing at this time. Patient denies any chest pain, chest tightness, palpitations, dizziness, syncopal episodes, lightheadedness at this time. No radiation of pain to report. Denies any N/V/D, abdominal discomfort, constipation, bloating at this time. Denies any blood in the stool, no abdominal distention noted beyond baseline as per patient. Denies any hematuria, dysuria, burning upon urination at this time.

## 2022-10-06 DIAGNOSIS — Z20.822 CONTACT WITH AND (SUSPECTED) EXPOSURE TO COVID-19: ICD-10-CM

## 2022-10-06 DIAGNOSIS — Z28.311 PARTIALLY VACCINATED FOR COVID-19: ICD-10-CM

## 2022-10-06 DIAGNOSIS — M79.18 MYALGIA, OTHER SITE: ICD-10-CM

## 2022-10-06 DIAGNOSIS — B34.9 VIRAL INFECTION, UNSPECIFIED: ICD-10-CM

## 2022-11-30 ENCOUNTER — EMERGENCY (EMERGENCY)
Facility: HOSPITAL | Age: 22
LOS: 1 days | Discharge: HOME | End: 2022-11-30
Attending: EMERGENCY MEDICINE | Admitting: EMERGENCY MEDICINE
Payer: SELF-PAY

## 2022-11-30 VITALS
TEMPERATURE: 99 F | HEART RATE: 88 BPM | SYSTOLIC BLOOD PRESSURE: 135 MMHG | HEIGHT: 70 IN | WEIGHT: 169.98 LBS | RESPIRATION RATE: 20 BRPM | OXYGEN SATURATION: 98 % | DIASTOLIC BLOOD PRESSURE: 70 MMHG

## 2022-11-30 VITALS
HEART RATE: 87 BPM | DIASTOLIC BLOOD PRESSURE: 78 MMHG | RESPIRATION RATE: 18 BRPM | OXYGEN SATURATION: 98 % | SYSTOLIC BLOOD PRESSURE: 128 MMHG

## 2022-11-30 DIAGNOSIS — U07.1 COVID-19: ICD-10-CM

## 2022-11-30 DIAGNOSIS — R05.9 COUGH, UNSPECIFIED: ICD-10-CM

## 2022-11-30 DIAGNOSIS — R07.9 CHEST PAIN, UNSPECIFIED: ICD-10-CM

## 2022-11-30 LAB
ALBUMIN SERPL ELPH-MCNC: 4.2 G/DL — SIGNIFICANT CHANGE UP (ref 3.4–5)
ALP SERPL-CCNC: 70 U/L — SIGNIFICANT CHANGE UP (ref 40–120)
ALT FLD-CCNC: 49 U/L — HIGH (ref 12–42)
ANION GAP SERPL CALC-SCNC: 6 MMOL/L — LOW (ref 9–16)
AST SERPL-CCNC: 47 U/L — HIGH (ref 15–37)
BASOPHILS # BLD AUTO: 0.04 K/UL — SIGNIFICANT CHANGE UP (ref 0–0.2)
BASOPHILS NFR BLD AUTO: 0.6 % — SIGNIFICANT CHANGE UP (ref 0–2)
BILIRUB SERPL-MCNC: 0.3 MG/DL — SIGNIFICANT CHANGE UP (ref 0.2–1.2)
BUN SERPL-MCNC: 14 MG/DL — SIGNIFICANT CHANGE UP (ref 7–23)
CALCIUM SERPL-MCNC: 9.2 MG/DL — SIGNIFICANT CHANGE UP (ref 8.5–10.5)
CHLORIDE SERPL-SCNC: 102 MMOL/L — SIGNIFICANT CHANGE UP (ref 96–108)
CO2 SERPL-SCNC: 29 MMOL/L — SIGNIFICANT CHANGE UP (ref 22–31)
CREAT SERPL-MCNC: 0.77 MG/DL — SIGNIFICANT CHANGE UP (ref 0.5–1.3)
EGFR: 128 ML/MIN/1.73M2 — SIGNIFICANT CHANGE UP
EOSINOPHIL # BLD AUTO: 0.27 K/UL — SIGNIFICANT CHANGE UP (ref 0–0.5)
EOSINOPHIL NFR BLD AUTO: 3.8 % — SIGNIFICANT CHANGE UP (ref 0–6)
FLUAV AG NPH QL: SIGNIFICANT CHANGE UP
FLUBV AG NPH QL: SIGNIFICANT CHANGE UP
GLUCOSE SERPL-MCNC: 94 MG/DL — SIGNIFICANT CHANGE UP (ref 70–99)
HCT VFR BLD CALC: 41.9 % — SIGNIFICANT CHANGE UP (ref 39–50)
HGB BLD-MCNC: 14.6 G/DL — SIGNIFICANT CHANGE UP (ref 13–17)
IMM GRANULOCYTES NFR BLD AUTO: 0.3 % — SIGNIFICANT CHANGE UP (ref 0–0.9)
LIDOCAIN IGE QN: 76 U/L — SIGNIFICANT CHANGE UP (ref 73–393)
LYMPHOCYTES # BLD AUTO: 2.09 K/UL — SIGNIFICANT CHANGE UP (ref 1–3.3)
LYMPHOCYTES # BLD AUTO: 29.4 % — SIGNIFICANT CHANGE UP (ref 13–44)
MCHC RBC-ENTMCNC: 33 PG — SIGNIFICANT CHANGE UP (ref 27–34)
MCHC RBC-ENTMCNC: 34.8 GM/DL — SIGNIFICANT CHANGE UP (ref 32–36)
MCV RBC AUTO: 94.8 FL — SIGNIFICANT CHANGE UP (ref 80–100)
MONOCYTES # BLD AUTO: 0.6 K/UL — SIGNIFICANT CHANGE UP (ref 0–0.9)
MONOCYTES NFR BLD AUTO: 8.5 % — SIGNIFICANT CHANGE UP (ref 2–14)
NEUTROPHILS # BLD AUTO: 4.08 K/UL — SIGNIFICANT CHANGE UP (ref 1.8–7.4)
NEUTROPHILS NFR BLD AUTO: 57.4 % — SIGNIFICANT CHANGE UP (ref 43–77)
NRBC # BLD: 0 /100 WBCS — SIGNIFICANT CHANGE UP (ref 0–0)
NT-PROBNP SERPL-SCNC: 5 PG/ML — SIGNIFICANT CHANGE UP
PLATELET # BLD AUTO: 243 K/UL — SIGNIFICANT CHANGE UP (ref 150–400)
POTASSIUM SERPL-MCNC: 4 MMOL/L — SIGNIFICANT CHANGE UP (ref 3.5–5.3)
POTASSIUM SERPL-SCNC: 4 MMOL/L — SIGNIFICANT CHANGE UP (ref 3.5–5.3)
PROT SERPL-MCNC: 8 G/DL — SIGNIFICANT CHANGE UP (ref 6.4–8.2)
RBC # BLD: 4.42 M/UL — SIGNIFICANT CHANGE UP (ref 4.2–5.8)
RBC # FLD: 12.1 % — SIGNIFICANT CHANGE UP (ref 10.3–14.5)
RSV RNA NPH QL NAA+NON-PROBE: SIGNIFICANT CHANGE UP
SARS-COV-2 RNA SPEC QL NAA+PROBE: DETECTED
SODIUM SERPL-SCNC: 137 MMOL/L — SIGNIFICANT CHANGE UP (ref 132–145)
TROPONIN I, HIGH SENSITIVITY RESULT: <4 NG/L — SIGNIFICANT CHANGE UP
WBC # BLD: 7.1 K/UL — SIGNIFICANT CHANGE UP (ref 3.8–10.5)
WBC # FLD AUTO: 7.1 K/UL — SIGNIFICANT CHANGE UP (ref 3.8–10.5)

## 2022-11-30 PROCEDURE — 99285 EMERGENCY DEPT VISIT HI MDM: CPT | Mod: 25

## 2022-11-30 PROCEDURE — 71046 X-RAY EXAM CHEST 2 VIEWS: CPT | Mod: 26

## 2022-11-30 PROCEDURE — 93010 ELECTROCARDIOGRAM REPORT: CPT

## 2022-11-30 RX ORDER — SODIUM CHLORIDE 9 MG/ML
1000 INJECTION INTRAMUSCULAR; INTRAVENOUS; SUBCUTANEOUS ONCE
Refills: 0 | Status: COMPLETED | OUTPATIENT
Start: 2022-11-30 | End: 2022-11-30

## 2022-11-30 RX ORDER — KETOROLAC TROMETHAMINE 30 MG/ML
15 SYRINGE (ML) INJECTION ONCE
Refills: 0 | Status: DISCONTINUED | OUTPATIENT
Start: 2022-11-30 | End: 2022-11-30

## 2022-11-30 RX ADMIN — SODIUM CHLORIDE 1000 MILLILITER(S): 9 INJECTION INTRAMUSCULAR; INTRAVENOUS; SUBCUTANEOUS at 16:36

## 2022-11-30 RX ADMIN — Medication 15 MILLIGRAM(S): at 16:36

## 2022-11-30 NOTE — ED ADULT TRIAGE NOTE - CHIEF COMPLAINT QUOTE
Pt walked into ER c/o cough, congestion and fevers. Pt states he had 102.2 fever on 11/28- has not taken temp since then. Pt also endorses enflamed gums. Pt states he [place]. Patient/Caregiver provided printed discharge information.

## 2022-11-30 NOTE — ED ADULT NURSE NOTE - CHIEF COMPLAINT QUOTE
Pt walked into ER c/o cough, congestion and fevers. Pt states he had 102.2 fever on 11/28- has not taken temp since then. Pt also endorses enflamed gums. Pt states he got dizzy [place].

## 2022-11-30 NOTE — ED PROVIDER NOTE - OBJECTIVE STATEMENT
23yo M no significant pmh presents with 2-3 days fever, chills, diffuse myalgias, cough, rhinorrhea, sneezing.  Vaccinated for COVID, did not get flu shot.  Some vomiting and diarrhea, no abd pain.  No leg swelling.  Mild CP, no SOB.  Felt lightheaded earlier today as well.

## 2022-11-30 NOTE — ED PROVIDER NOTE - NS ED ROS FT
Constitutional:  +fever, +chills, No night sweats  Eyes:  No visual changes, No discharge, No redness  ENMT:  No epistaxis, no nasal congestion, no throat pain, no difficulty swallowing  CV:  No chest pain, No palpitations, No peripheral edema  Resp:  +cough, No shortness of breath  GI:  No abdominal pain, +vomiting, +diarrhea  MSK:  No neck pain or stiffness, No joint swelling or pain, No back pain  Neuro: no loss of consciousness, no gait abnormality, no headache, no sensory deficits, and no weakness.  Skin:  No abrasions, no lesions, no rashes  Psych:  No known mental health issues

## 2022-11-30 NOTE — ED PROVIDER NOTE - PATIENT PORTAL LINK FT
You can access the FollowMyHealth Patient Portal offered by [facility] by registering at the following website: [email or web address]. By joining [employer]’s FollowMyHealth portal, you will also be able to view your health information using other applications (apps) compatible with our system.

## 2022-11-30 NOTE — ED PROVIDER NOTE - NSFOLLOWUPINSTRUCTIONS_ED_ALL_ED_FT
COVID-19      COVID-19, or coronavirus disease 2019, is a systemic infection that is caused by a novel coronavirus called SARS-CoV-2. In some people, the virus may not cause any symptoms. In others, it may cause mild or severe symptoms. Some people with severe infection develop severe disease, which may lead to acute respiratory distress syndrome and shock.      What are the causes?  The human body, showing how the coronavirus travels from the air to a person's lungs.   This illness is caused by a virus. The virus may be in the air or on surfaces as droplets or aerosols of various sizes. You may catch the virus by:  •Breathing in droplets from an infected person. Droplets can be spread by a person breathing, speaking, singing, coughing, or sneezing.      •Touching something, like a table or a doorknob, that was exposed to the virus (is contaminated) and then touching your mouth, nose, or eyes.        What increases the risk?    Risk for infection:     You are more likely to become infected with the COVID-19 virus if:  •You are within 6 ft (1.8 m) of a person with COVID-19 for 15 minutes or longer.      •You are providing care for a person who is infected with COVID-19.      •You are in close personal contact with other people. Close personal contact includes hugging, kissing, or sharing eating or drinking utensils.      Risk for serious illness due to COVID-19:    You are more likely to become seriously ill from the COVID-19 virus if:  •You have cancer.    •You have a long-term (chronic) disease, such as:  •Chronic lung disease, including pulmonary embolism, chronic obstructive pulmonary disease, and cystic fibrosis.      •Long-term disease that lowers your body's ability to fight infection (immunocompromise).      •Serious cardiac conditions, such as heart failure, coronary artery disease, or cardiomyopathy.      •Diabetes.      •Chronic kidney disease.      •Liver diseases, including cirrhosis, nonalcoholic fatty liver disease, alcoholic liver disease, or autoimmune hepatitis.        •You are obese.      •You are pregnant or recently pregnant.      •You have sickle cell disease.        What are the signs or symptoms?    Symptoms of this condition can range from mild to severe. Symptoms may appear any time from 2 to 14 days after being exposed to the virus. They include:  •Fever or chills.      •Difficulty breathing or having shortness of breath.      •Feeling tired or very tired.      •Headaches, body aches, or muscle aches.      •Runny or stuffy nose, sneezing, cough, sore throat.      •New loss of taste or smell. This is rare.      Some people may also have stomach problems, such as nausea, vomiting, or diarrhea.    Other people may not have any symptoms of COVID-19.      How is this diagnosed?  A sample being collected by swabbing the nose.   This condition may be diagnosed by testing samples to check for the COVID-19 virus. The most common tests are the PCR test and the antigen test. Tests may be done in the lab or at home. They include:  •Using a swab to take a sample of fluid from the back of your nose and throat (nasopharyngeal fluid), from your nose, or from your throat.      •Testing a sample of saliva from your mouth.      •Testing a sample of coughed-up mucus from your lungs (sputum).        How is this treated?    Treatment for COVID-19 infection depends on the severity of the condition.  •Mild symptoms can be managed at home with rest, fluids, and over-the-counter medicines.    •Serious symptoms may be treated in a hospital intensive care unit, or ICU. Treatment in the ICU may include:  •Supplemental oxygen. Extra oxygen is given through a tube in the nose, a face mask, or a doan.    •Medicines. You may be given medicines:  •To help your body fight off certain viruses that can cause disease (antivirals).      •To reduce inflammation and suppress the immune system (corticosteroids).      •To prevent or treat blood clots, if they develop (antithrombotics).        •Antibodies made in a lab that can restore or strengthen your body's natural immune system (monoclonal antibody).      •Positioning you to lie on your stomach (prone position). This makes it easier for oxygen to get into the lungs.      •Infection control measures.        If you are at risk for more serious illness due to COVID-19, your health care provider may prescribe a combination of two long-acting monoclonal antibodies, administered together every 6 months.      How is this prevented?    To protect yourself:   •Get vaccinated. COVID-19 vaccines are available for everyone aged 6 months and older.  •Vaccination is recommended for women who are pregnant, may become pregnant, or are breastfeeding.      •Patients who require major surgery should plan their vaccination for several days before or after the surgery.      •Talk to your health care provider about receiving experimental monoclonal antibodies. This treatment has been approved under emergency use authorization to prevent severe illness before or after you are exposed to the COVID-19 virus. You may be given monoclonal antibodies if:  •You are moderately or severely immunocompromised. This includes treatments that lower your immune response. People with immunocompromise may not develop protection against COVID-19 when they are vaccinated.      •You cannot be vaccinated. You may not receive a vaccine if you have a severe allergic reaction to the vaccine or its components.      •You are not fully vaccinated.      •You are in close contact with a person who is infected with SARS-CoV-2, or at high risk of exposure to SARS-CoV-2, in an institution in which COVID-19 is occurring.      •You are at risk of illness from new variants of the COVID-19 virus.        To protect others:     If you have symptoms of COVID-19, take steps to prevent the virus from spreading to others.  •Stay home. Leave your house only to seek medical care. Do not use public transport, if possible.      •Do not travel while you are sick.      •Wash your hands often with soap and water for at least 20 seconds. If soap and water are not available, use alcohol-based hand .      •Stay away from other members of your household. Let healthy household members care for children and pets, if possible. If you have to care for children or pets, wash your hands often and wear a mask. If possible, stay in your own room, separate from others. Use a different bathroom.      •Make sure that all people in your household wash their hands well and often.      •Cough or sneeze into a tissue or your sleeve or elbow. Do not cough or sneeze into your hand or into the air.        Where to find more information    •Centers for Disease Control and Prevention: www.cdc.gov/coronavirus      •World Health Organization: www.who.int/health-topics/coronavirus        Get help right away if:    •You have trouble breathing.      •You have pain or pressure in your chest.      •You have confusion.      •You have bluish lips and fingernails.      •You have difficulty waking from sleep.      •You have symptoms that get worse.      These symptoms may be an emergency. Get help right away. Call 911.   • Do not wait to see if the symptoms will go away.        •  Do not drive yourself to the hospital.         Summary    •COVID-19 is a respiratory infection that is caused by a virus.      •Some people with a severe COVID-19 infection develop severe disease, which may lead to acute respiratory distress syndrome and shock.      •The virus that causes COVID-19 is contagious. This means that it can spread from person to person through droplets from breathing, speaking, singing, coughing, or sneezing.      •Mild symptoms of COVID-19 can be managed at home with rest, fluids, and over-the-counter medicines.      This information is not intended to replace advice given to you by your health care provider. Make sure you discuss any questions you have with your health care provider.

## 2022-11-30 NOTE — ED ADULT NURSE NOTE - OBJECTIVE STATEMENT
Pt aox3 with steady gait. Pt walked into ER c/o cough, congestion and fevers. Pt states he had 102.2 fever on 11/28- has not taken temp since then. Pt also endorses enflamed gums. Pt states he got dizzy today [place]. Denies syncope, chest pain or sob

## 2022-11-30 NOTE — ED PROVIDER NOTE - CLINICAL SUMMARY MEDICAL DECISION MAKING FREE TEXT BOX
Pt w no significant pmh presents with URI symptoms for the past 2-3 days.  On exam afebrile, VSS, well appearing, lungs CTAB, abd soft NDNT.  Suspect viral syn, ddx includes ACS, PNA, other.  Plan for labs, CXR, IV hydration, analgesia, reassess. Pt w no significant pmh presents with URI symptoms for the past 2-3 days.  On exam afebrile, VSS, well appearing, lungs CTAB, abd soft NDNT.  Suspect viral syn, ddx includes ACS, PNA, other.  Plan for labs, CXR, IV hydration, analgesia, reassess.    COVID positive.  CXR clear.  Pt does not meet criteria for paxlovid.  Satting well on RA.  Understands return precautions and instructions for self isolation and masking.  Stable for dc.

## 2022-11-30 NOTE — ED PROVIDER NOTE - PHYSICAL EXAMINATION
Constitutional:  Well appearing, awake, alert, oriented to person, place, time/situation and in no apparent distress.  HEENT: Airway patent, Nasal mucosa clear. Mouth with normal mucosa. TTP over L posterior lower molar  Eyes: Clear bilaterally, pupils equal, round and reactive to light.  Cardiac: Normal rate, regular rhythm.  Respiratory: Breath sounds clear and equal bilaterally.  GI: Abdomen soft, non-tender, no guarding.  MSK: Spine appears normal, range of motion is not limited, no muscle or joint tenderness  Neuro: Alert and oriented, no focal deficits, no motor or sensory deficits.  Skin: Skin normal color for race, warm, dry and intact. No evidence of rash.

## 2022-12-18 ENCOUNTER — EMERGENCY (EMERGENCY)
Facility: HOSPITAL | Age: 22
LOS: 1 days | Discharge: HOME | End: 2022-12-18
Attending: EMERGENCY MEDICINE | Admitting: EMERGENCY MEDICINE
Payer: SELF-PAY

## 2022-12-18 VITALS
HEART RATE: 81 BPM | OXYGEN SATURATION: 97 % | SYSTOLIC BLOOD PRESSURE: 128 MMHG | RESPIRATION RATE: 16 BRPM | TEMPERATURE: 98 F | DIASTOLIC BLOOD PRESSURE: 84 MMHG

## 2022-12-18 VITALS
SYSTOLIC BLOOD PRESSURE: 134 MMHG | HEIGHT: 70 IN | HEART RATE: 89 BPM | OXYGEN SATURATION: 97 % | TEMPERATURE: 98 F | RESPIRATION RATE: 15 BRPM | WEIGHT: 175.05 LBS | DIASTOLIC BLOOD PRESSURE: 90 MMHG

## 2022-12-18 DIAGNOSIS — R21 RASH AND OTHER NONSPECIFIC SKIN ERUPTION: ICD-10-CM

## 2022-12-18 DIAGNOSIS — L50.9 URTICARIA, UNSPECIFIED: ICD-10-CM

## 2022-12-18 DIAGNOSIS — F17.200 NICOTINE DEPENDENCE, UNSPECIFIED, UNCOMPLICATED: ICD-10-CM

## 2022-12-18 PROCEDURE — 99053 MED SERV 10PM-8AM 24 HR FAC: CPT

## 2022-12-18 PROCEDURE — 99283 EMERGENCY DEPT VISIT LOW MDM: CPT

## 2022-12-18 RX ORDER — DIPHENHYDRAMINE HCL 50 MG
50 CAPSULE ORAL ONCE
Refills: 0 | Status: COMPLETED | OUTPATIENT
Start: 2022-12-18 | End: 2022-12-18

## 2022-12-18 RX ORDER — DIPHENHYDRAMINE HCL 50 MG
1 CAPSULE ORAL
Qty: 20 | Refills: 0
Start: 2022-12-18 | End: 2022-12-22

## 2022-12-18 RX ORDER — FAMOTIDINE 10 MG/ML
20 INJECTION INTRAVENOUS ONCE
Refills: 0 | Status: COMPLETED | OUTPATIENT
Start: 2022-12-18 | End: 2022-12-18

## 2022-12-18 RX ADMIN — FAMOTIDINE 20 MILLIGRAM(S): 10 INJECTION INTRAVENOUS at 09:14

## 2022-12-18 RX ADMIN — Medication 50 MILLIGRAM(S): at 09:14

## 2022-12-18 NOTE — ED PROVIDER NOTE - NSFOLLOWUPINSTRUCTIONS_ED_ALL_ED_FT
Urticaria    WHAT YOU NEED TO KNOW:    Urticaria is also called hives. Hives can change size and shape, and appear anywhere on your skin. They can be mild or severe and last from a few minutes to a few days. Hives may be a sign of a severe allergic reaction called anaphylaxis that needs immediate treatment. Urticaria that lasts longer than 6 weeks may be a chronic condition that needs long-term treatment.  Hives         DISCHARGE INSTRUCTIONS:    Call your local emergency number (911 in the ) for signs or symptoms of anaphylaxis, such as trouble breathing, swelling in your mouth or throat, or wheezing. You may also have itching, a rash, or feel like you are going to faint.    Return to the emergency department if:   •Your heart is beating faster than it normally does.      •You have cramping or severe pain in your abdomen.      Call your doctor if:   •You have a fever.      •Your skin still itches 24 hours after you take your medicine.      •You still have hives after 7 days.      •Your joints are painful and swollen.      •You have questions or concerns about your condition or care.      Medicines: You may need any of the following:  •Epinephrine is used to treat severe allergic reactions such as anaphylaxis.      •Antihistamines decrease mild symptoms such as itching or a rash.      •Steroids decrease redness, pain, and swelling.      •Take your medicine as directed. Contact your healthcare provider if you think your medicine is not helping or if you have side effects. Tell your provider if you are allergic to any medicine. Keep a list of the medicines, vitamins, and herbs you take. Include the amounts, and when and why you take them. Bring the list or the pill bottles to follow-up visits. Carry your medicine list with you in case of an emergency.      Steps to take for signs or symptoms of anaphylaxis:   •Immediately give 1 shot of epinephrine only into the outer thigh muscle.  How to Give An Epinephrine Shot Adult           •Leave the shot in place as directed. Your provider may recommend you leave it in place for up to 10 seconds before you remove it. This helps make sure all of the epinephrine is delivered.      •Call 911 and go to the emergency department, even if the shot improved symptoms. Do not drive yourself. Bring the used epinephrine shot with you.      Safety precautions to take if you are at risk for anaphylaxis:   •Keep 2 shots of epinephrine with you at all times. You may need a second shot, because epinephrine only works for about 20 minutes and symptoms may return. Your provider can show you and family members how to give the shot. Check the expiration date every month and replace it before it expires.      •Create an action plan. Your provider can help you create a written plan that explains the allergy and an emergency plan to treat a reaction. The plan explains when to give a second epinephrine shot if symptoms return or do not improve after the first. Give copies of the action plan and emergency instructions to family members, work and school staff, and  providers. Show them how to give a shot of epinephrine.      •Be careful when you exercise. If you have had exercise-induced anaphylaxis, do not exercise right after you eat. Stop exercising right away if you start to develop any signs or symptoms of anaphylaxis. You may first feel tired, warm, or have itchy skin. Hives, swelling, and severe breathing problems may develop if you continue to exercise.      •Carry medical alert identification. Wear medical alert jewelry or carry a card that explains the allergy. Ask your provider where to get these items.   Medical Alert Jewelry           •Keep a record of triggers and symptoms. Record everything you eat, drink, or apply to your skin for 3 weeks. Include stressful events and what you were doing right before your hives started. Bring the record with you to follow-up visits with your provider.      Manage urticaria:   •Cool your skin. This may help decrease itching. Apply a cool pack to your hives. Dip a hand towel in cool water, wring it out, and place it on your hives. You may also soak your skin in a cool oatmeal bath.      •Do not rub your hives. This can irritate your skin and cause more hives.      •Wear loose clothing. Tight clothes may irritate your skin and cause more hives.      •Manage stress. Stress may trigger hives, or make them worse. Learn new ways to relax, such as deep breathing.       Follow up with your healthcare provider as directed: Write down your questions so you remember to ask them during your visits.

## 2022-12-18 NOTE — ED PROVIDER NOTE - CLINICAL SUMMARY MEDICAL DECISION MAKING FREE TEXT BOX
urticarial rash over abd/upper extremities, no other system involvement, appears allergic/histamine related, treating with steroids/[id number], d/c home with return precautions and anticipatory guidance. Does not appear infectious at this time but encouraged to return should rash worsen or change.

## 2022-12-18 NOTE — ED PROVIDER NOTE - PATIENT PORTAL LINK FT
You can access the FollowMyHealth Patient Portal offered by [facility] by registering at the following website: [email or web address]. By joining [facility]’s FollowMyHealth portal, you will also be able to view your health information using other applications (apps) compatible with our system.

## 2022-12-18 NOTE — ED PROVIDER NOTE - OBJECTIVE STATEMENT
23 y/o M w/no sig pmhx p/w pruritic erythematous rash over abdomen and UE's ongoing for 2 days after pt reports being exposed to unsanitary conditions [place] [employer]. No SOB, n/v or abd pain. No hx allergies or similar rashes in the past.

## 2022-12-18 NOTE — ED ADULT TRIAGE NOTE - CHIEF COMPLAINT QUOTE
Pt with complaint of itchy red "welts" on his abdomen stating he was in [place] booking for 2 days and is afraid he caught a bacterial infection.

## 2022-12-18 NOTE — ED PROVIDER NOTE - PHYSICAL EXAMINATION
CONSTITUTIONAL: Well appearing, well nourished, awake, alert, oriented to person, place, time/situation and in no apparent distress.  ENT: Airway patent, Nasal mucosa clear. Mouth with normal mucosa.  EYES: Clear bilaterally.  RESPIRATORY: Breathing comfortably with normal RR.  CARDIO: RRR  MSK: Range of motion is not limited, no deformities noted.  NEURO: Alert and oriented, no focal deficits.  SKIN: Skin normal color for race, warm, dry and intact. + maculopapular erythematous blanching rash to abdomen and RUE, no ulceration/induration/fluctuance/scaling  PSYCH: Alert and oriented to person, place, time/situation. normal mood and affect. no apparent risk to self or others.

## 2022-12-18 NOTE — ED ADULT NURSE NOTE - NS_SISCREENINGSR_GEN_ALL_ED
3/24/2022              Pietro Mcgovern        [address] [facility] [address]         Dear Estella Streeter,    This letter is to inform you that our office has made several attempts to reach you by phone without success. We were attempting to contact you by phone regarding prescription request.      Please contact our office at the number listed below as soon as you receive this letter to discuss this issue and to make the necessary changes in our system to your contact information. Thank you for your cooperation. Sincerely,    Karuna Sauceda.  [address] [facility]        Document electronically generated by:  Alfredo Ferreira RN Negative

## 2024-03-06 NOTE — ED ADULT NURSE NOTE - NSSEPSISSUSPECTED_ED_A_ED
[facility] ([address])  Discharge Final Note    Primary Care Provider: Andrew Rodriguez MD    Expected Discharge Date: 3/6/2024    Final Discharge Note (most recent)       Final Note - 03/06/24 1548          Final Note    Hospital Resources/Appts/Education Provided Appointments scheduled and added to AVS        Post-Acute Status    Post-Acute Authorization Home Health     Home Health Status Set-up Complete/Auth obtained     Patient choice form signed by patient/caregiver List from System Post-Acute Care;List from CMS Compare;List with quality metrics by geographic area provided     Discharge Delays None known at this time                                  Contact Info       [facility]    [id number] [place] [address]  [phone]       Next Steps: Follow up          Future Appointments   Date Time Provider Department Center   4/10/2024  4:00 PM Stefania Boyce MD [facility] C[facility]   5/16/2024  9:00 AM [facility]   5/16/2024 10:00 AM Miguel Montano MD [facility]        met with patient  and discussed discharge plans, patient to [place] home with HH.   Patients medications delivered to bedside. No   HME/DME  delivered  to bedside and follow up appointment[s] scheduled.   Transportation provided by family via private vehicle.      Nusrat Gaxiola RN  Case Management  [facility]  [phone]      No

## 2024-04-10 NOTE — ED PROVIDER NOTE - NSICDXPASTMEDICALHX_GEN_ALL_CORE_FT
HISTORY OF PRESENT ILLNESS:  Patient presents complaining of hair loss.  She was recently restarted on her levothyroxine.  She also complains of occasional “heart thumping”.  No chest pain.  No nausea or vomiting.  No diaphoresis or palpitations.    PAST MEDICAL HISTORY:    Essential (primary) hypertension                              Prediabetes                                                   Shoulder pain                                                 ALLERGIES:  No Known Allergies  Social History     Socioeconomic History    Marital status: Single     Spouse name: Not on file    Number of children: Not on file    Years of education: Not on file    Highest education level: Not on file   Occupational History    Not on file   Tobacco Use    Smoking status: Never    Smokeless tobacco: Never   Vaping Use    Vaping status: never used   Substance and Sexual Activity    Alcohol use: Never    Drug use: Never    Sexual activity: Not Currently   Other Topics Concern    Not on file   Social History Narrative    Not on file     Social Determinants of Health     Financial Resource Strain: Not on file   Food Insecurity: Not on file   Transportation Needs: Not on file   Physical Activity: Not on file   Stress: Not on file   Social Connections: Not on file   Interpersonal Safety: Not on file         REVIEW OF SYSTEMS:   14 point review of systems negative except as noted in the history of present illness.      PHYSICAL EXAMINATION:   Visit Vitals  /76 (BP Location: LUE - Left upper extremity, Patient Position: Sitting, Cuff Size: Regular)   Pulse 61   Resp 16   Ht 4' 11\" (1.499 m)   Wt 83.6 kg (184 lb 4.8 oz)   SpO2 96%   BMI 37.22 kg/m²     General:  Well-developed, well-nourished, no acute distress.  Eyes: Normal lids, conjunctivae clear bilaterally. PERRL(Pupils equal, round, reactive to light).  ENT: External ears normal, nares symmetric, no discharge. Moist mucous membranes.  Respiratory: Normal respiratory  effort, lungs clear to auscultation bilaterally  Cardiovascular: Regular rate and rhythm, no murmurs. No LE(lower extremity) edema.  Gastrointestinal: Abdomen soft, nontender, non-distended. Active bowel sounds.   Skin: Warm, no rashes or lesions. No palpable nodules.      ASSESSMENT AND PLAN:  Alopecia and palpitations.  Patient was sent for CBC and iron.  Will have her back in 3 months to recheck TSH.  Follow-up otherwise as needed      Lavon Lisa MD  4/10/2024     PAST MEDICAL HISTORY:  Drug abuse and dependence     GERD (gastroesophageal reflux disease)     MDD (major depressive disorder)     Suicidal ideations

## 2024-04-18 NOTE — ED ADULT TRIAGE NOTE - ESI TRIAGE ACUITY LEVEL, MLM
4/22/2024      Ethel Zaman  [address]  [facility] [address]    Dear MsMario Junie,    Your procedure is scheduled for May 21, 2024 with Dr. Govind Baez at:    [facility]  [address] [place]   [address]  [phone]  Please enter the main entrance and take the elevators to the 3rd floor.  Check in at Same Day Surgery desk.    You can expect to be contacted by Katie on 5/17/24 to confirm arrival and surgery time.    The following appointment(s) have been scheduled for you:         Pre-Procedural COVID Testing is NOT required as long as you remain symptom-free from now through your surgery date.  At any time, if you experience COVID-like symptoms, please contact your primary care physician for evaluation.  If you test positive for COVID between now and your surgery date, please call our office as soon as possible.  We might need to postpone your procedure until it is safe for you to proceed. For more information, please visit our website at [email or web address]     Follow up with Dr. Baez at the [facility], Medical Office [address], [address] ([address]  [place] [address]) on May 8, 2024 at 1 pm .    Post-op with  Pinky Cordova NP for Dr. Baez at the [facility], [facility] Office [address] ([address] [place] [address]) on June 4, 2024 at 1:40 pm .                          To better prepare for your surgery, please follow these instructions:    Please schedule an appointment with your Primary Care Physician for a PreOperative History and Physical for 2-3 weeks before your surgery date.   This is a pre-operative requirement for medical clearance for surgery/anesthesia. Your primary care physician will perform a history and physical and order lab work to review, ensuring there is not any other medical  concern that would prevent safely proceeding with surgery.  We will send them the information about your planned procedure and what testing we are looking for (and where to send it to).   If you haven't already done so, please call Siomara Ponce MD as soon as possible to schedule an appointment. Please call Katie, surgery scheduler at [phone], when your appointment has been scheduled with your primary care physician. Go ahead and leave a voicemail with this information (date, time, and name of your doctor).    Dr. Baez also asks that if you see a heart doctor (Cardiologist) regularly, please touch base with their office letting them know of your surgery date and what you are having done. Their office will let you know if you need any further testing and/or if you need to see them prior to surgery.    All Diabetic or Weight Loss Medication (both traditional weight loss and diabetic medications used to treat weight loss) must be stopped 7 days  before surgery or your surgery will be cancelled.  If you are diabetic, please consult with your prescribing physician for any alternative or additional medication instructions before surgery.    Starting 5 days prior to your surgery, please do not take any aspirin products, anti-inflammatory medication or blood thinners.  This includes products such as [place], Pepto Bismol, Motrin, Ibuprofen and Advil should also be avoided.  (Standard Tylenol products are aspirin free, so Standard Tylenol products are OK to take for pain.).      If you take any other prescription medication, including blood thinners such as Coumadin, Eliquis, Plavix, or Xarelto, please contact your ordering or primary care physician ASAP, so that you can inform them about your upcoming surgery and so that they can decide with you if any changes to your medication schedule are necessary.  If approved by your physician, you may take blood pressure and heart medications the morning of  the procedure with a small amount of water.    Do not have anything to eat or drink starting at midnight the night before your surgery.     Be sure you use your HibiClens!    It is a topical antiseptic, antimicrobial skin cleanser; Hibiclens gently and effectively cleanses skin and superficial wounds and can protect the skin for up to 24 hours. It's gentle on patient skin and as simple and easy to use as any liquid soap. If not provided by your doctor's office, purchase an 8 ounce bottle at your local pharmacy as this is over the counter.            For your safety, you must have a ride home after surgery, due to both anesthesia and post-op pain medication. You must be with someone who can take responsibility for you and assist with your discharge from the surgery center, not a cab, bus, etc.  You will need someone available to remain with you up to 24 hours after you have been discharged.    Please remember that all times are subject to change as the hospital coordinates the schedule to meet the needs of your surgery and the overall flow of the OR that day.  You will be called ASAP to advise you of any changes to your surgery time or the time you need to arrive for your surgery.      Before your surgery, you will receive an invitation via email from [employer], our partner in Patient Education.    This informative web-based education program will give you helpful information pertaining to your upcoming surgery and recovery.  We encourage you to watch the videos before your surgery. They contain valuable information that will help you know what to expect, what you can do to recover and answer some of the questions you may have. Having that knowledge can help you work towards getting back to your normal routines as soon as possible.    You may also receive an invitation from us via your [employer] account a short 5 min questionnaire regarding the impact your diagnosis has on your current abilities and activities.  Our  hospital and clinic teams will help you complete this if you don't have a chance to on your own (or do not have a current [employer] account).  Your surgeon is hoping to use this information to bobbi and monitor your progress starting before your surgery and in the weeks/months during your recovery after your procedure.  Thank you for your participation!    If you have any work related and/or disability forms that need to be completed, please contact the Forms Completion Department at [phone]. Forms can be dropped off at any of our [facility] locations. Please be advised that it can take 7 to 14 business days to complete these requests.    If you have questions regarding the procedure, medications, rehab, etc., please contact the nursing staff at [phone].    If you have any scheduling questions or need to reschedule, please contact me at the telephone number and extension listed below.     Thank you,    Katie at [phone]  Surgery Scheduler for Dr. Govind Baez  [facility]                    Insurance Authorization Need to Know’s     Prior to your surgical procedure, our team will contact your Insurance Company to initiate a PreAuthorization request.       This is not a guarantee of payment from your insurance company, but rather a step taken to ensure that we have all of the information and documentation for them to confirm the procedure is one that is eligible for coverage under your plan.     We will contact you if we either need more information from you to fulfill the requirements of your insurance company, or if we need to discuss any concerns that may lead to postponement or cancellation of your procedure. If you have any questions regarding your surgery authorization, please check with your insurance company or call our office for an update.     If you need information regarding your level of benefits or out-of-pocket expenses, please contact your insurance company  directly.  They can also confirm for you whether or not we (the surgeon and the hospital/surgery center) are in your plan’s preferred network (aka ‘in-network’).     What to do if… My Insurance Changes:  If, at any time, your insurance company, plan or even card changes… Please call our office so that our team can be sure to update your records.  We will need to make sure to submit any PreAuth or jim to the correct, up-to-date insurance plan.       What to do if… My Insurance Requires A Referral:  If your insurance company requires a Referral for Specialty Care or to see a Specialist, you will need to confirm with them if you have one on file.    If your insurance carrier does not have a referral, then you will need to contact your Primary Care Physician to have one directly submitted to your insurance company ASAP.    Without a referral on file, your insurance company will not Pre-Authorize your surgery and may not cover any of your care with our specialty.     What to do if… I have a Workers Compensation (W/C) Claim:  If you have a W/C claim, please be sure to provide our reception team with the information you have regarding your claim ASAP.  We will contact your W/C carrier/adjustor to inform them of your upcoming surgery and check the status of your claim (open vs closed).  We will let you know if they advise of any concerns or issues with your claim.  Even if you have an open W/C claim, please also provide us with your personal/family insurance.  We will want to be sure this plan is loaded into your account.  We always PreAuthorize with personal insurance as a back-up to W/C.  Otherwise, if W/C doesn’t cover something along the way, you will receive a bill for the services.     What to do if… I have Month-to-Month Coverage/Premiums:  If you have an insurance plan that is paid for month to month, or is subject to plan change on a monthly basis, please be aware we cannot initiate PreAuthorization until just  before the month of your surgery, as your insurance company will need to verify your premium payments/eligibility first.     What to do if… I Do Not Have Insurance Coverage or Have other Insurance/Billing Questions:  Please call our Patient Contact Center:  [phone] to speak with a team member about your billing needs, including possible coverage options, setting up payment plans, our fee schedule, etc.        MEDICATIONS TO STOP / CHANGE BEFORE SURGERY  Please read through this list to make sure you are adjusting your medication appropriately before surgery.  Failure to do so might result in cancellation or rescheduling surgery.    BLOOD THINNERS / ANTICOAGULATION MEDICATIONS    If you take prescription medication that is a blood thinner, please contact the prescribing provider or primary care ASAP to determine when to hold the medication prior to surgery.     Examples include:  Warfarin (Coumadin)     Clopidrogel (Plavix)  Apixaban (Eliquis)  Rivaroxaban (Xarelto)  ASPIRIN and ANTI-INFLAMMATORY (NSAID) PRODUCTS   Do not take FIVE (5) days prior to procedure.    OVER-THE-COUNTER:    Aspirin… including:  Anacin, [place], [place], Aspergum, Aspercin, Aspermin, Aspertab, Back-quell, Duradyne, Empirin, Gemnisyn, Genprin, Gensan, Magnaprin, McNess Pain Tab, Momentum, P-A-C, Pain Reliever Tabs, Tri-Pain Caplets, Vanquish Caplet.  Buffered Aspirin… including:  Ascriptin, Bufferin, Ecotrin, Buffaprin, Buffasal, Buffinol, COPE.  Aspirin Suppositories (generic, any strength)  Excedrin, Excedrin Extra, Excedrin IB  Ibuprofen… including: Advil, Nuprin, Motrin IB, Adapin, Genpril, Ibufen 200, Menadol, Midol IB, Dristan Sinus, Ursinus Inlay Tabs, Dimetapp Sinus, Valparin, Haltran Tabs, Richie's Pills, Foster.  Naproxen… including: Aleve  Clarita [place] or Bromo-[place]  Pepto Bismol     PRESCRIPTION:  Brand Name Generic Name Brand Name Generic Name      Fiorinal   butalbital, aspirin, caffeine    Lodine   etodolac      Naprosyn,  Anaprox   naproxen    Mobic   meloxicam      Voltaren, Cataflam   diclofenac    Meclomen   meclofenamate      Feldene   piroxicam    Nalfon   fenoprofen      Motrin (Rx), Rufen   ibuprofen    Ponstel   mefenamic acid      Ansaid   flurbiprofen    Relafen   nabumetone      Orudis   ketoprofen    Toradol   ketorolac      Dolobid   diflunisal    Azdone Tabs   aspirin plus hydrocodone      Clinoril   sulindac    Percodan   aspirin plus oxycodone      Indocin   indomethacin    Synalgos   aspirin plus dihydrocodeine      Tolectin   tolmetin    Daypro   oxaprozin                    WEIGHT LOSS MEDICATIONS  If you are taking these medications and have DIABETES please contact your primary care doctor about when to stop these medications and whether you will need an alternative medication.  If on WEEKLY dosing, hold SEVEN (7) days prior to procedure.   If on DAILY dosing, hold on the day of procedure.   Dulaglutide (Trulicity)  Exenatide (Bydureon BCise, Byetta)  Liraglutide (Victoza, Saxenda)  Pramlintide acetate (Symlin)  Semaglutide (Ozempic, Wegovy, Rybelsus)  Tirzepatide (Mounjaro)  Liraglutide + insulin Degludec (Xultophy)  Lixisenatide + insulin Glargine (Soliqua)    Do not take SEVEN (7) days prior to procedure.    Benzphetamine  Diethylpropion  Phendimetrazine  Phentermine (Adipex, Lomaira)  Phentermine/topiramate (Qsymia)...  Note: to prevent seizures from abrupt withdrawal, take a dose every other day  for at least 1 week before stopping treatment.  Herbs and Dietary Supplements  Do not take FIVE (5) days prior to procedure.        Alfalfa    American ginseng    Mary    Anise    Arnica [place]    Asafetida    [place] bark    Bilberry    Birch    Black cohosh    Bladderwrack    Bogbean    Boldo    Borage seed oil    Bromelain    Capsicum    Cat's claw    Celery     Chamomile   Shortsville    Clove    Coleus    Cordyceps       Dandelion     Danshen    Devil's claw    Dong quai    EPA (eicosapentaenoic    acid, found  in fish oils)    Evening primrose oil    Fenugreek    Feverfew    Fish oil    Flaxseed/flax powder     Garlic    Sol    Ginkgo biloba    Grapefruit juice     Grapeseed     Green tea    Guggul    Gymnestra    Horse chestnut    Horseradish    Licorie root    Lovage root    Male fern    Meadowsweet    Melatonin    Nordihydroguairetic acid (NDGA)    Omega-3 fatty acids    Onion    Papain    Panax ginseng    Parsley      Passionflower    Poplar   Prickly christa    Propolis    Quassia    Red clover    Reishi    Saw palmetto    Siberian ginseng    Sweet clover    Rue    Sweet birch    Turmeric    Vitamin E    White willow    Wild carrot    Wild lettuce    Dunreith    Valdez    Lawson        3

## 2024-07-24 NOTE — ED ADULT NURSE NOTE - NSIMPLEMENTINTERV_GEN_ALL_ED
27.4 Implemented All Universal Safety Interventions:  [place] to call system. Call bell, personal items and telephone within reach. Instruct patient to call for assistance. Room bathroom lighting operational. Non-slip footwear when patient is off stretcher. Physically safe environment: no spills, clutter or unnecessary equipment. Stretcher in lowest position, wheels locked, appropriate side rails in place.

## 2024-09-23 ENCOUNTER — EMERGENCY (EMERGENCY)
Facility: HOSPITAL | Age: 24
LOS: 1 days | Discharge: LEFT BEFORE DISCHARGE | End: 2024-09-23
Attending: EMERGENCY MEDICINE | Admitting: EMERGENCY MEDICINE
Payer: SELF-PAY

## 2024-09-23 VITALS
HEART RATE: 79 BPM | RESPIRATION RATE: 16 BRPM | WEIGHT: 169.98 LBS | OXYGEN SATURATION: 97 % | SYSTOLIC BLOOD PRESSURE: 103 MMHG | DIASTOLIC BLOOD PRESSURE: 68 MMHG | TEMPERATURE: 98 F | HEIGHT: 70 IN

## 2024-09-23 PROCEDURE — L9991: CPT

## 2024-09-23 NOTE — ED ADULT TRIAGE NOTE - CHIEF COMPLAINT QUOTE
Pt. walk in for R. ear pain, feeling of ear fullness, and increased fatigue x 5 days. On the 1st day pt. used a q-tip and there was blood, since then no blood or discharge. Denies hearing changes. Reporting body aches and chills 2 days ago., ,No PMH.

## 2024-09-26 DIAGNOSIS — H92.01 OTALGIA, RIGHT EAR: ICD-10-CM

## 2024-09-26 DIAGNOSIS — Z53.21 PROCEDURE AND TREATMENT NOT CARRIED OUT DUE TO PATIENT LEAVING PRIOR TO BEING SEEN BY HEALTH CARE PROVIDER: ICD-10-CM

## 2024-12-31 NOTE — ED ADULT TRIAGE NOTE - ESI TRIAGE ACUITY LEVEL, MLM
States that he is still having problems occasionally with urinary leakage.  No burning with urination and nocturia x 1 without change.  We do have concerns about his urinary difficulties and have arranged for the patient to be seen by urology for further evaluation and/or treatment.  Again check PSA    Orders:    Ambulatory Referral to Urology; Future     2

## 2025-01-08 NOTE — PROGRESS NOTE BEHAVIORAL HEALTH - SECONDARY DX1
Head,  normocephalic,  atraumatic,  Face,  Face within normal limits,  Ears,  External ears within normal limits,  Nose/Nasopharynx,  External nose  normal appearance,  nares patent,  no nasal discharge,  Mouth and Throat,  Oral cavity appearance normal,  Breath odor normal,  Lips,  Appearance normal 
Panic disorder without agoraphobia

## 2025-05-27 NOTE — H&P ADULT - NECK DETAILS
Chief Complaint   Patient presents with    Anxiety     FOLLOW UP ON ANXIETY AND NEEDS MED REFILLED, MC AND UDS DUE TODAY          ASSESSMENT/PLAN    Problem List           Diagnosed       Other    Restless leg syndrome     Relevant Medications    clonazePAM (KLONOPIN) 1 MG tablet    Other Relevant Orders    POCT Rapid Drug Screen (Completed)    Ambulatory referral to Psychiatry    Anxiety     Relevant Medications    clonazePAM (KLONOPIN) 1 MG tablet    Other Relevant Orders    POCT Rapid Drug Screen (Completed)    Ambulatory referral to Psychiatry    Benzodiazepine dependence (HCC) - Primary      Chronic, not at goal (unstable), Has been using Clonazepam chronically over several years for anxiety and RLS, which is highly inappropriate. I stated to the patient that I do not have an expertise in chronic BZD management and therefore, referral to Latanya Alegre of psychiatry has been ordered for further management.          The encounter was quite unpleasant, as patient had been very argumentative when discussed about the inappropriateness of the long-term BZD therapy. Patient eventually even accused me for \"trying to milk the money out of her\" by making a referral to the different provider for BZD management. Patient repeatedly voiced her skepticism about filling out the CSA, stating that \"I've never had to do this before,\" even though I provided the scanned copy of the previously signed CSA that was done on February 2024, which then patient changed her response as \"I don't remember doing this.\" I believe that the encounter has been severely disrupted to the point that I cannot effectively establish the patient-provider relationship that is necessary for the optimal medical care and therefore, will no longer provide the care for the patient in this office. Patient is more than welcome to follow-up with other providers in the office or outside providers in the mean time.    Patient was recommended to follow up with  supple/normal/no JVD

## 2025-07-03 ENCOUNTER — EMERGENCY (EMERGENCY)
Facility: HOSPITAL | Age: 25
LOS: 1 days | End: 2025-07-03
Admitting: EMERGENCY MEDICINE
Payer: SELF-PAY

## 2025-07-03 VITALS
DIASTOLIC BLOOD PRESSURE: 81 MMHG | TEMPERATURE: 98 F | WEIGHT: 164.91 LBS | RESPIRATION RATE: 18 BRPM | HEART RATE: 89 BPM | SYSTOLIC BLOOD PRESSURE: 123 MMHG | OXYGEN SATURATION: 95 %

## 2025-07-03 LAB
ANION GAP SERPL CALC-SCNC: 8 MMOL/L — LOW (ref 9–16)
BASOPHILS # BLD AUTO: 0.02 K/UL — SIGNIFICANT CHANGE UP (ref 0–0.2)
BASOPHILS NFR BLD AUTO: 0.3 % — SIGNIFICANT CHANGE UP (ref 0–2)
BUN SERPL-MCNC: 10 MG/DL — SIGNIFICANT CHANGE UP (ref 7–23)
CALCIUM SERPL-MCNC: 8.6 MG/DL — SIGNIFICANT CHANGE UP (ref 8.5–10.5)
CHLORIDE SERPL-SCNC: 105 MMOL/L — SIGNIFICANT CHANGE UP (ref 96–108)
CO2 SERPL-SCNC: 28 MMOL/L — SIGNIFICANT CHANGE UP (ref 22–31)
CREAT SERPL-MCNC: 0.74 MG/DL — SIGNIFICANT CHANGE UP (ref 0.5–1.3)
CRP SERPL-MCNC: 9.7 MG/L — HIGH (ref 0.5–3)
EGFR: 127 ML/MIN/1.73M2 — SIGNIFICANT CHANGE UP
EGFR: 127 ML/MIN/1.73M2 — SIGNIFICANT CHANGE UP
EOSINOPHIL # BLD AUTO: 0.1 K/UL — SIGNIFICANT CHANGE UP (ref 0–0.5)
EOSINOPHIL NFR BLD AUTO: 1.6 % — SIGNIFICANT CHANGE UP (ref 0–6)
GLUCOSE SERPL-MCNC: 98 MG/DL — SIGNIFICANT CHANGE UP (ref 70–99)
HCT VFR BLD CALC: 39 % — SIGNIFICANT CHANGE UP (ref 39–50)
HGB BLD-MCNC: 13.5 G/DL — SIGNIFICANT CHANGE UP (ref 13–17)
HIV 1 & 2 AB SERPL IA.RAPID: SIGNIFICANT CHANGE UP
IMM GRANULOCYTES # BLD AUTO: 0.01 K/UL — SIGNIFICANT CHANGE UP (ref 0–0.07)
IMM GRANULOCYTES NFR BLD AUTO: 0.2 % — SIGNIFICANT CHANGE UP (ref 0–0.9)
LYMPHOCYTES # BLD AUTO: 2.09 K/UL — SIGNIFICANT CHANGE UP (ref 1–3.3)
LYMPHOCYTES NFR BLD AUTO: 33.5 % — SIGNIFICANT CHANGE UP (ref 13–44)
MCHC RBC-ENTMCNC: 31.5 PG — SIGNIFICANT CHANGE UP (ref 27–34)
MCHC RBC-ENTMCNC: 34.6 G/DL — SIGNIFICANT CHANGE UP (ref 32–36)
MCV RBC AUTO: 90.9 FL — SIGNIFICANT CHANGE UP (ref 80–100)
MONOCYTES # BLD AUTO: 0.45 K/UL — SIGNIFICANT CHANGE UP (ref 0–0.9)
MONOCYTES NFR BLD AUTO: 7.2 % — SIGNIFICANT CHANGE UP (ref 2–14)
NEUTROPHILS # BLD AUTO: 3.56 K/UL — SIGNIFICANT CHANGE UP (ref 1.8–7.4)
NEUTROPHILS NFR BLD AUTO: 57.2 % — SIGNIFICANT CHANGE UP (ref 43–77)
NRBC # BLD AUTO: 0 K/UL — SIGNIFICANT CHANGE UP (ref 0–0)
NRBC # FLD: 0 K/UL — SIGNIFICANT CHANGE UP (ref 0–0)
NRBC BLD AUTO-RTO: 0 /100 WBCS — SIGNIFICANT CHANGE UP (ref 0–0)
PLATELET # BLD AUTO: 303 K/UL — SIGNIFICANT CHANGE UP (ref 150–400)
PMV BLD: 9.6 FL — SIGNIFICANT CHANGE UP (ref 7–13)
POTASSIUM SERPL-MCNC: 4 MMOL/L — SIGNIFICANT CHANGE UP (ref 3.5–5.3)
POTASSIUM SERPL-SCNC: 4 MMOL/L — SIGNIFICANT CHANGE UP (ref 3.5–5.3)
RBC # BLD: 4.29 M/UL — SIGNIFICANT CHANGE UP (ref 4.2–5.8)
RBC # FLD: 11.9 % — SIGNIFICANT CHANGE UP (ref 10.3–14.5)
SODIUM SERPL-SCNC: 141 MMOL/L — SIGNIFICANT CHANGE UP (ref 132–145)
WBC # BLD: 6.23 K/UL — SIGNIFICANT CHANGE UP (ref 3.8–10.5)
WBC # FLD AUTO: 6.23 K/UL — SIGNIFICANT CHANGE UP (ref 3.8–10.5)

## 2025-07-03 PROCEDURE — 70487 CT MAXILLOFACIAL W/DYE: CPT | Mod: 26

## 2025-07-03 PROCEDURE — 99285 EMERGENCY DEPT VISIT HI MDM: CPT

## 2025-07-03 RX ORDER — ACETAMINOPHEN 500 MG/5ML
975 LIQUID (ML) ORAL ONCE
Refills: 0 | Status: COMPLETED | OUTPATIENT
Start: 2025-07-03 | End: 2025-07-03

## 2025-07-03 RX ORDER — CLINDAMYCIN PHOSPHATE 150 MG/ML
600 VIAL (ML) INJECTION ONCE
Refills: 0 | Status: COMPLETED | OUTPATIENT
Start: 2025-07-03 | End: 2025-07-03

## 2025-07-03 RX ORDER — KETOROLAC TROMETHAMINE 30 MG/ML
15 INJECTION, SOLUTION INTRAMUSCULAR; INTRAVENOUS ONCE
Refills: 0 | Status: DISCONTINUED | OUTPATIENT
Start: 2025-07-03 | End: 2025-07-03

## 2025-07-03 RX ADMIN — Medication 975 MILLIGRAM(S): at 21:35

## 2025-07-03 RX ADMIN — KETOROLAC TROMETHAMINE 15 MILLIGRAM(S): 30 INJECTION, SOLUTION INTRAMUSCULAR; INTRAVENOUS at 21:34

## 2025-07-03 RX ADMIN — Medication 100 MILLIGRAM(S): at 21:34

## 2025-07-03 NOTE — ED PROVIDER NOTE - PATIENT PORTAL LINK FT
You can access the FollowMyHealth Patient Portal offered by [facility] by registering at the following website: [email or web address]. By joining [employer]’s FollowMyHealth portal, you will also be able to view your health information using other applications (apps) compatible with our system.

## 2025-07-03 NOTE — ED PROVIDER NOTE - PHYSICAL EXAMINATION
Gen - WDWN M, NAD, speaking in full sentences, phonating well   Skin - no acute rash, intact   HEENT - AT/NC, PERRL, EOMI, pupils 3mm b/l, no conjunctival injection or dc, neck supple and FROM, poor dentition, +dental caries to tooth #1 and 4, no focal gingival fluctuance or dc, TTP over cheek, mastoid and temporal region, no trismus, airway patent, no drooling, no palpable LN   CV - S1S2, R/R/R  Resp - CTAB, no focal r/r/w   MSK - w/w/p, full ROM of peripheral joints, no midline tenderness   Psych - euphoria, normal speech and eye contact, judgement/insight intact   Neuro - AxOx3, ambulatory with steady gait

## 2025-07-03 NOTE — ED ADULT TRIAGE NOTE - CHIEF COMPLAINT QUOTE
Pt c/o dental pain right upper side x 1 wk. associated with headache/ gum swelling. no meds taken PTA

## 2025-07-03 NOTE — ED PROVIDER NOTE - CARE PROVIDER_API CALL
your dentist,   Phone: (   )    -  Fax: (   )    -  Follow Up Time:     Hans Franz  Oral/Maxillofacial Surgery  [address] [place] [address], [place] [address]  Phone: [phone]  Fax: [phone]  Follow Up Time:

## 2025-07-03 NOTE — ED PROVIDER NOTE - NSICDXPASTMEDICALHX_GEN_ALL_CORE_FT
PAST MEDICAL HISTORY:  Drug abuse and dependence     GERD (gastroesophageal reflux disease)     MDD (major depressive disorder)     Suicidal ideations

## 2025-07-03 NOTE — ED PROVIDER NOTE - OBJECTIVE STATEMENT
26 yo M with no known PMHx, has dental appt in 3d, presenting c/o R jaw/facial pain x 1d. Pt reports having mild dental pain yesterday, got worse overnight and noted "small bites of my tooth were coming off" Pain radiates to the cheek, jaw, temporal and behind the R ear region with subjective chills. Denies fever, change in phonation, drooling, throat/facial pain, swelling, d/c, tinnitus, HA, dizziness, N/V/D/C, cough, SOB, CP, palpitations, focal weakness, paresthesia, rash, trauma, and fall

## 2025-07-03 NOTE — ED PROVIDER NOTE - PROGRESS NOTE DETAILS
Pt reassessed at bedside. Reports improvement in pain after analgesics. Repeat exam non focal and pt is ambulatory with steady gait. Tolerating po with discomfort and pain adequately controlled, awaiting CT reads

## 2025-07-03 NOTE — ED PROVIDER NOTE - CLINICAL SUMMARY MEDICAL DECISION MAKING FREE TEXT BOX
26 yo M with no known PMHx, has dental appt in 3d, presenting c/o R jaw/facial pain x 1d. Pt reports having mild dental pain yesterday, got worse overnight and noted "small bites of my tooth were coming off" Pain radiates to the cheek, jaw, temporal and behind the R ear region with subjective chills.  DDx including but not limited to the following -   dental caries, dental abscess, retropharyngeal abscess, mastoiditis, parotiditis, low suspicion for pato's, no deep space infection   - will obtain medical labs, inflammatory marker, CT with IV contrast, IV analgesics, and a dose IV abx, reassess 24 yo M with no known PMHx, has dental appt in 3d, presenting c/o R jaw/facial pain x 1d. Pt reports having mild dental pain yesterday, got worse overnight and noted "small bites of my tooth were coming off" Pain radiates to the cheek, jaw, temporal and behind the R ear region with subjective chills.  DDx including but not limited to the following -   dental caries, dental abscess, retropharyngeal abscess, mastoiditis, parotiditis, low suspicion for pato's, no deep space infection   - will obtain medical labs, inflammatory marker, CT with IV contrast, IV analgesics, and a dose IV abx, reassess    - labs non actionable, except for elevated crp, CT with no focal abscess, deep space infection or other actionable findings, pain adequately controlled, will dc home on a course of NSAIDs, clindamycin and encouraged salt water gargle     Based on my personal interpretation of pt's labs/images and entire work up during the ED stay, results, ddx, incidental findings, and f/u plans discussed in length with pt at bedside. AFVSS, pt is non-toxic appearing and has remained stable throughout the stay after ED interventions. D/c'd home to f/u with dental and [facility] dental clinic, strict return precautions discussed, prompt return to ED for any worsening or new sx, pt verbalized understanding.

## 2025-07-03 NOTE — ED ADULT NURSE NOTE - CAS TRG GENERAL NORM CIRC DET
Called [facility] informing Nurse has paper work ready and report has been called. [facility] shaq Paged out for transfer at this time.   Strong peripheral pulses

## 2025-07-03 NOTE — ED PROVIDER NOTE - NSFOLLOWUPINSTRUCTIONS_ED_ALL_ED_FT
discharge instructions for dental pain due to caries (tooth decay) without abscess, written in clear, patient-friendly language:    Discharge Instructions for Dental Pain (Cavities/Tooth Decay) – No Abscess    You have been seen for dental pain caused by a cavity (tooth decay). There is currently no sign of an abscess (serious infection), but prompt dental care is still important to prevent complications.    How to Care for Yourself at Home  1. Pain Relief    Use over-the-counter medications such as acetaminophen (Tylenol) or ibuprofen (Advil, Motrin) as directed for pain.  Avoid very hot, cold, or sweet foods and drinks which may make the pain worse.  2. Oral Hygiene    Gently brush your teeth twice daily with a soft toothbrush and fluoride toothpaste.  Floss gently once daily to remove food particles but avoid vigorous flossing near the sore tooth.  Rinse your mouth with warm salt water (1/2 teaspoon salt in 8 oz of warm water) 2–3 times a day to help soothe the area.  3. Diet    Eat soft foods if chewing is uncomfortable.  Avoid chewing on the side of the sore tooth until you can be seen by a dentist.  4. Do Not Ignore the Pain    Dental pain due to cavities will not go away on its own and usually gets worse over time if not treated.  Left untreated, decay can lead to infection or abscess.  5. Call a Dentist    Make a dental appointment as soon as possible for a thorough examination and treatment.  Early treatment can prevent complications and save your tooth.  When to Seek Medical/Dental Attention Immediately  Call your provider or go to the emergency room if you develop:    Severe, constant, or worsening pain  Facial swelling or swelling of the jaw/gums  Difficulty opening your mouth, swallowing, or breathing  Fever or chills  Pus or foul-tasting drainage in your mouth  Summary  Take pain medicine as needed, maintain good oral hygiene, avoid foods that worsen the pain, and make a dental appointment as soon as possible. If you develop any new or concerning symptoms, seek care right away.    If you have any questions or concerns, contact your healthcare provider or dentist.    Please follow-up at the [facility] 8-4pm.  The [facility] is located at [address] [place] [address] ([facility]) in [place].  It is first come first serve so please arrive early in the morning if possible.

## 2025-07-03 NOTE — ED ADULT NURSE NOTE - NSFALLUNIVINTERV_ED_ALL_ED
Bed/Stretcher in lowest position, wheels locked, appropriate side rails in place/Call bell, personal items and telephone in reach/Instruct patient to call for assistance before getting out of bed/chair/stretcher/Non-slip footwear applied when patient is off stretcher/[place] to call system/Physically safe environment - no spills, clutter or unnecessary equipment/Purposeful proactive rounding/Room/bathroom lighting operational, light cord in reach

## 2025-07-04 VITALS
TEMPERATURE: 98 F | RESPIRATION RATE: 18 BRPM | OXYGEN SATURATION: 99 % | HEART RATE: 68 BPM | SYSTOLIC BLOOD PRESSURE: 134 MMHG | DIASTOLIC BLOOD PRESSURE: 88 MMHG

## 2025-07-04 RX ORDER — NAPROXEN SODIUM 275 MG
1 TABLET ORAL
Qty: 14 | Refills: 0
Start: 2025-07-04

## 2025-07-04 RX ORDER — ACETAMINOPHEN 500 MG/5ML
2 LIQUID (ML) ORAL
Qty: 20 | Refills: 0
Start: 2025-07-04

## 2025-07-04 RX ORDER — CLINDAMYCIN PHOSPHATE 150 MG/ML
1 VIAL (ML) INJECTION
Qty: 21 | Refills: 0
Start: 2025-07-04 | End: 2025-07-10

## 2025-07-07 DIAGNOSIS — R68.84 JAW PAIN: ICD-10-CM

## 2025-07-07 DIAGNOSIS — K02.9 DENTAL CARIES, UNSPECIFIED: ICD-10-CM

## 2025-07-07 DIAGNOSIS — G50.0 TRIGEMINAL NEURALGIA: ICD-10-CM

## 2025-07-07 DIAGNOSIS — F17.200 NICOTINE DEPENDENCE, UNSPECIFIED, UNCOMPLICATED: ICD-10-CM

## 2025-07-08 NOTE — ED PROVIDER NOTE - NS_EDPROVIDERDISPOUSERTYPE_ED_A_ED
I have personally evaluated and examined the patient. The Attending was available to me as a supervising provider if needed.
ADMIT